# Patient Record
Sex: FEMALE | Race: WHITE | Employment: FULL TIME | URBAN - METROPOLITAN AREA
[De-identification: names, ages, dates, MRNs, and addresses within clinical notes are randomized per-mention and may not be internally consistent; named-entity substitution may affect disease eponyms.]

---

## 2017-02-24 ENCOUNTER — OFFICE VISIT (OUTPATIENT)
Dept: FAMILY MEDICINE CLINIC | Age: 57
End: 2017-02-24

## 2017-02-24 VITALS
BODY MASS INDEX: 39.05 KG/M2 | OXYGEN SATURATION: 98 % | WEIGHT: 234.4 LBS | SYSTOLIC BLOOD PRESSURE: 124 MMHG | DIASTOLIC BLOOD PRESSURE: 84 MMHG | HEART RATE: 68 BPM | HEIGHT: 65 IN | RESPIRATION RATE: 15 BRPM

## 2017-02-24 DIAGNOSIS — H90.3 SENSORINEURAL HEARING LOSS (SNHL), BILATERAL: ICD-10-CM

## 2017-02-24 DIAGNOSIS — E78.5 HYPERLIPIDEMIA, UNSPECIFIED: ICD-10-CM

## 2017-02-24 DIAGNOSIS — F33.42 RECURRENT MAJOR DEPRESSIVE DISORDER, IN FULL REMISSION (HCC): ICD-10-CM

## 2017-02-24 DIAGNOSIS — E66.9 OBESITY (BMI 30-39.9): ICD-10-CM

## 2017-02-24 DIAGNOSIS — Z98.84 S/P LAPAROSCOPIC SLEEVE GASTRECTOMY: ICD-10-CM

## 2017-02-24 DIAGNOSIS — K21.9 GASTROESOPHAGEAL REFLUX DISEASE, ESOPHAGITIS PRESENCE NOT SPECIFIED: ICD-10-CM

## 2017-02-24 DIAGNOSIS — E66.09 NON MORBID OBESITY DUE TO EXCESS CALORIES: ICD-10-CM

## 2017-02-24 DIAGNOSIS — E11.8 TYPE 2 DIABETES MELLITUS WITH COMPLICATION, WITHOUT LONG-TERM CURRENT USE OF INSULIN (HCC): Primary | ICD-10-CM

## 2017-02-24 DIAGNOSIS — J30.89 PERENNIAL ALLERGIC RHINITIS, UNSPECIFIED ALLERGIC RHINITIS TRIGGER: ICD-10-CM

## 2017-02-24 DIAGNOSIS — K76.0 HEPATIC STEATOSIS: ICD-10-CM

## 2017-02-24 DIAGNOSIS — I10 ESSENTIAL HYPERTENSION: ICD-10-CM

## 2017-02-24 PROCEDURE — 90471 IMMUNIZATION ADMIN: CPT | Performed by: FAMILY MEDICINE

## 2017-02-24 PROCEDURE — 90732 PPSV23 VACC 2 YRS+ SUBQ/IM: CPT | Performed by: FAMILY MEDICINE

## 2017-02-24 PROCEDURE — 99215 OFFICE O/P EST HI 40 MIN: CPT | Performed by: FAMILY MEDICINE

## 2017-03-07 ENCOUNTER — OFFICE VISIT (OUTPATIENT)
Dept: FAMILY MEDICINE CLINIC | Age: 57
End: 2017-03-07

## 2017-03-07 VITALS
WEIGHT: 235 LBS | BODY MASS INDEX: 37.77 KG/M2 | DIASTOLIC BLOOD PRESSURE: 82 MMHG | SYSTOLIC BLOOD PRESSURE: 130 MMHG | RESPIRATION RATE: 12 BRPM | HEIGHT: 66 IN | HEART RATE: 75 BPM | OXYGEN SATURATION: 96 %

## 2017-03-07 DIAGNOSIS — R35.0 URINARY FREQUENCY: Primary | ICD-10-CM

## 2017-03-07 LAB
BILIRUBIN, POC: 0
BLOOD URINE, POC: 0
CLARITY, POC: CLEAR
COLOR, POC: YELLOW
GLUCOSE URINE, POC: 0
KETONES, POC: 0
LEUKOCYTE EST, POC: ABNORMAL
NITRITE, POC: 0
PH, POC: 6
PROTEIN, POC: 0
SPECIFIC GRAVITY, POC: 1.02
UROBILINOGEN, POC: 0.2

## 2017-03-07 PROCEDURE — 99213 OFFICE O/P EST LOW 20 MIN: CPT | Performed by: NURSE PRACTITIONER

## 2017-03-07 PROCEDURE — 81002 URINALYSIS NONAUTO W/O SCOPE: CPT | Performed by: NURSE PRACTITIONER

## 2017-03-07 RX ORDER — CIPROFLOXACIN 500 MG/1
500 TABLET, FILM COATED ORAL 2 TIMES DAILY
Qty: 6 TABLET | Refills: 0 | Status: SHIPPED | OUTPATIENT
Start: 2017-03-07 | End: 2017-03-10

## 2017-03-07 ASSESSMENT — ENCOUNTER SYMPTOMS: ABDOMINAL PAIN: 0

## 2017-03-07 ASSESSMENT — PATIENT HEALTH QUESTIONNAIRE - PHQ9
1. LITTLE INTEREST OR PLEASURE IN DOING THINGS: 0
SUM OF ALL RESPONSES TO PHQ9 QUESTIONS 1 & 2: 0
SUM OF ALL RESPONSES TO PHQ QUESTIONS 1-9: 0
2. FEELING DOWN, DEPRESSED OR HOPELESS: 0

## 2017-03-09 LAB
ORGANISM: ABNORMAL
URINE CULTURE, ROUTINE: ABNORMAL

## 2017-03-28 DIAGNOSIS — E11.8 TYPE 2 DIABETES MELLITUS WITH COMPLICATION, WITHOUT LONG-TERM CURRENT USE OF INSULIN (HCC): ICD-10-CM

## 2017-03-28 DIAGNOSIS — I10 ESSENTIAL HYPERTENSION: ICD-10-CM

## 2017-03-28 DIAGNOSIS — E78.5 HYPERLIPIDEMIA, UNSPECIFIED: ICD-10-CM

## 2017-03-28 LAB
A/G RATIO: 1.5 (ref 1.1–2.2)
ALBUMIN SERPL-MCNC: 4.3 G/DL (ref 3.4–5)
ALP BLD-CCNC: 96 U/L (ref 40–129)
ALT SERPL-CCNC: 8 U/L (ref 10–40)
ANION GAP SERPL CALCULATED.3IONS-SCNC: 14 MMOL/L (ref 3–16)
AST SERPL-CCNC: 9 U/L (ref 15–37)
BASOPHILS ABSOLUTE: 0.1 K/UL (ref 0–0.2)
BASOPHILS RELATIVE PERCENT: 0.8 %
BILIRUB SERPL-MCNC: 0.3 MG/DL (ref 0–1)
BUN BLDV-MCNC: 18 MG/DL (ref 7–20)
CALCIUM SERPL-MCNC: 9.6 MG/DL (ref 8.3–10.6)
CHLORIDE BLD-SCNC: 103 MMOL/L (ref 99–110)
CHOLESTEROL, TOTAL: 273 MG/DL (ref 0–199)
CO2: 25 MMOL/L (ref 21–32)
CREAT SERPL-MCNC: 0.7 MG/DL (ref 0.6–1.1)
EOSINOPHILS ABSOLUTE: 0.2 K/UL (ref 0–0.6)
EOSINOPHILS RELATIVE PERCENT: 1.9 %
GFR AFRICAN AMERICAN: >60
GFR NON-AFRICAN AMERICAN: >60
GLOBULIN: 2.9 G/DL
GLUCOSE BLD-MCNC: 108 MG/DL (ref 70–99)
HCT VFR BLD CALC: 41.3 % (ref 36–48)
HDLC SERPL-MCNC: 43 MG/DL (ref 40–60)
HEMOGLOBIN: 13.1 G/DL (ref 12–16)
LDL CHOLESTEROL CALCULATED: ABNORMAL MG/DL
LYMPHOCYTES ABSOLUTE: 3.3 K/UL (ref 1–5.1)
LYMPHOCYTES RELATIVE PERCENT: 37.3 %
MCH RBC QN AUTO: 27 PG (ref 26–34)
MCHC RBC AUTO-ENTMCNC: 31.8 G/DL (ref 31–36)
MCV RBC AUTO: 85 FL (ref 80–100)
MONOCYTES ABSOLUTE: 0.6 K/UL (ref 0–1.3)
MONOCYTES RELATIVE PERCENT: 6.5 %
NEUTROPHILS ABSOLUTE: 4.7 K/UL (ref 1.7–7.7)
NEUTROPHILS RELATIVE PERCENT: 53.5 %
PDW BLD-RTO: 16 % (ref 12.4–15.4)
PLATELET # BLD: 287 K/UL (ref 135–450)
PMV BLD AUTO: 9.2 FL (ref 5–10.5)
POTASSIUM SERPL-SCNC: 4.5 MMOL/L (ref 3.5–5.1)
RBC # BLD: 4.86 M/UL (ref 4–5.2)
SODIUM BLD-SCNC: 142 MMOL/L (ref 136–145)
TOTAL PROTEIN: 7.2 G/DL (ref 6.4–8.2)
TRIGL SERPL-MCNC: 364 MG/DL (ref 0–150)
VLDLC SERPL CALC-MCNC: ABNORMAL MG/DL
WBC # BLD: 8.9 K/UL (ref 4–11)

## 2017-03-29 LAB
ESTIMATED AVERAGE GLUCOSE: 134.1 MG/DL
HBA1C MFR BLD: 6.3 %
LDL CHOLESTEROL DIRECT: 190 MG/DL

## 2017-03-31 ENCOUNTER — OFFICE VISIT (OUTPATIENT)
Dept: FAMILY MEDICINE CLINIC | Age: 57
End: 2017-03-31

## 2017-03-31 VITALS
WEIGHT: 233.6 LBS | RESPIRATION RATE: 16 BRPM | SYSTOLIC BLOOD PRESSURE: 128 MMHG | BODY MASS INDEX: 37.54 KG/M2 | OXYGEN SATURATION: 98 % | HEIGHT: 66 IN | HEART RATE: 72 BPM | DIASTOLIC BLOOD PRESSURE: 84 MMHG

## 2017-03-31 DIAGNOSIS — E66.9 OBESITY (BMI 30-39.9): ICD-10-CM

## 2017-03-31 DIAGNOSIS — E78.2 MIXED HYPERLIPIDEMIA: ICD-10-CM

## 2017-03-31 DIAGNOSIS — R94.31 Q WAVES SUGGESTIVE OF PREVIOUS MYOCARDIAL INFARCTION: ICD-10-CM

## 2017-03-31 DIAGNOSIS — R01.1 HEART MURMUR PREVIOUSLY UNDIAGNOSED: ICD-10-CM

## 2017-03-31 DIAGNOSIS — Z00.00 ANNUAL PHYSICAL EXAM: Primary | ICD-10-CM

## 2017-03-31 DIAGNOSIS — Z98.84 S/P LAPAROSCOPIC SLEEVE GASTRECTOMY: ICD-10-CM

## 2017-03-31 DIAGNOSIS — I10 ESSENTIAL HYPERTENSION: ICD-10-CM

## 2017-03-31 DIAGNOSIS — F33.42 RECURRENT MAJOR DEPRESSIVE DISORDER, IN FULL REMISSION (HCC): ICD-10-CM

## 2017-03-31 DIAGNOSIS — E11.9 TYPE 2 DIABETES MELLITUS WITHOUT COMPLICATION, WITHOUT LONG-TERM CURRENT USE OF INSULIN (HCC): ICD-10-CM

## 2017-03-31 DIAGNOSIS — Z12.31 VISIT FOR SCREENING MAMMOGRAM: ICD-10-CM

## 2017-03-31 LAB
BILIRUBIN, POC: NORMAL
BLOOD URINE, POC: NORMAL
CLARITY, POC: CLEAR
COLOR, POC: YELLOW
CREATININE URINE POCT: 200
GLUCOSE URINE, POC: NORMAL
KETONES, POC: NORMAL
LEUKOCYTE EST, POC: NORMAL
MICROALBUMIN/CREAT 24H UR: 10 MG/G{CREAT}
MICROALBUMIN/CREAT UR-RTO: <30
NITRITE, POC: NORMAL
PH, POC: 6
PROTEIN, POC: NORMAL
SPECIFIC GRAVITY, POC: >1.03
UROBILINOGEN, POC: 0.2

## 2017-03-31 PROCEDURE — 82044 UR ALBUMIN SEMIQUANTITATIVE: CPT | Performed by: FAMILY MEDICINE

## 2017-03-31 PROCEDURE — 99396 PREV VISIT EST AGE 40-64: CPT | Performed by: FAMILY MEDICINE

## 2017-03-31 PROCEDURE — 93000 ELECTROCARDIOGRAM COMPLETE: CPT | Performed by: FAMILY MEDICINE

## 2017-03-31 PROCEDURE — 81002 URINALYSIS NONAUTO W/O SCOPE: CPT | Performed by: FAMILY MEDICINE

## 2017-03-31 RX ORDER — ATORVASTATIN CALCIUM 40 MG/1
40 TABLET, FILM COATED ORAL NIGHTLY
Qty: 30 TABLET | Refills: 11 | Status: SHIPPED | OUTPATIENT
Start: 2017-03-31 | End: 2017-05-31 | Stop reason: SDUPTHER

## 2017-04-03 ENCOUNTER — OFFICE VISIT (OUTPATIENT)
Dept: CARDIOLOGY CLINIC | Age: 57
End: 2017-04-03

## 2017-04-03 VITALS
HEIGHT: 65 IN | DIASTOLIC BLOOD PRESSURE: 70 MMHG | SYSTOLIC BLOOD PRESSURE: 116 MMHG | BODY MASS INDEX: 38.49 KG/M2 | HEART RATE: 84 BPM | WEIGHT: 231 LBS

## 2017-04-03 DIAGNOSIS — R07.9 CHEST PAIN, UNSPECIFIED TYPE: ICD-10-CM

## 2017-04-03 DIAGNOSIS — R06.02 SHORTNESS OF BREATH: ICD-10-CM

## 2017-04-03 DIAGNOSIS — R94.31 ABNORMAL EKG: Primary | ICD-10-CM

## 2017-04-03 DIAGNOSIS — E78.5 HYPERLIPIDEMIA, UNSPECIFIED HYPERLIPIDEMIA TYPE: ICD-10-CM

## 2017-04-03 PROCEDURE — 99244 OFF/OP CNSLTJ NEW/EST MOD 40: CPT | Performed by: INTERNAL MEDICINE

## 2017-04-13 ENCOUNTER — OFFICE VISIT (OUTPATIENT)
Dept: CARDIOLOGY CLINIC | Age: 57
End: 2017-04-13

## 2017-04-13 ENCOUNTER — HOSPITAL ENCOUNTER (OUTPATIENT)
Dept: NON INVASIVE DIAGNOSTICS | Age: 57
Discharge: HOME | End: 2017-04-13
Attending: INTERNAL MEDICINE | Admitting: INTERNAL MEDICINE

## 2017-04-13 VITALS
HEART RATE: 80 BPM | SYSTOLIC BLOOD PRESSURE: 136 MMHG | WEIGHT: 231 LBS | BODY MASS INDEX: 38.49 KG/M2 | DIASTOLIC BLOOD PRESSURE: 62 MMHG | HEIGHT: 65 IN

## 2017-04-13 DIAGNOSIS — R07.2 PRECORDIAL PAIN: ICD-10-CM

## 2017-04-13 DIAGNOSIS — R94.31 ABNORMAL EKG: Primary | ICD-10-CM

## 2017-04-13 PROBLEM — R07.9 CHEST PAIN: Status: ACTIVE | Noted: 2017-04-13

## 2017-04-13 PROCEDURE — 99213 OFFICE O/P EST LOW 20 MIN: CPT | Performed by: INTERNAL MEDICINE

## 2017-04-26 ENCOUNTER — TELEPHONE (OUTPATIENT)
Dept: FAMILY MEDICINE CLINIC | Age: 57
End: 2017-04-26

## 2017-05-31 DIAGNOSIS — F33.42 RECURRENT MAJOR DEPRESSIVE DISORDER, IN FULL REMISSION (HCC): ICD-10-CM

## 2017-05-31 DIAGNOSIS — E78.2 MIXED HYPERLIPIDEMIA: ICD-10-CM

## 2017-05-31 RX ORDER — VALSARTAN AND HYDROCHLOROTHIAZIDE 320; 25 MG/1; MG/1
1 TABLET, FILM COATED ORAL DAILY
Qty: 30 TABLET | Refills: 0 | Status: SHIPPED | OUTPATIENT
Start: 2017-05-31 | End: 2017-10-18 | Stop reason: SDUPTHER

## 2017-05-31 RX ORDER — ATORVASTATIN CALCIUM 40 MG/1
40 TABLET, FILM COATED ORAL NIGHTLY
Qty: 30 TABLET | Refills: 11 | Status: SHIPPED | OUTPATIENT
Start: 2017-05-31 | End: 2017-10-18 | Stop reason: SDUPTHER

## 2017-05-31 RX ORDER — VALSARTAN AND HYDROCHLOROTHIAZIDE 320; 25 MG/1; MG/1
1 TABLET, FILM COATED ORAL DAILY
Qty: 90 TABLET | Refills: 2 | Status: SHIPPED | OUTPATIENT
Start: 2017-05-31 | End: 2021-09-20

## 2017-05-31 RX ORDER — ATORVASTATIN CALCIUM 40 MG/1
40 TABLET, FILM COATED ORAL NIGHTLY
Qty: 90 TABLET | Refills: 2 | Status: SHIPPED | OUTPATIENT
Start: 2017-05-31 | End: 2019-08-02

## 2017-10-18 ENCOUNTER — OFFICE VISIT (OUTPATIENT)
Dept: ENT CLINIC | Age: 57
End: 2017-10-18

## 2017-10-18 VITALS
HEIGHT: 65 IN | HEART RATE: 79 BPM | WEIGHT: 242 LBS | SYSTOLIC BLOOD PRESSURE: 170 MMHG | DIASTOLIC BLOOD PRESSURE: 92 MMHG | BODY MASS INDEX: 40.32 KG/M2

## 2017-10-18 DIAGNOSIS — J30.9 ALLERGIC SINUSITIS: Primary | ICD-10-CM

## 2017-10-18 PROCEDURE — 99213 OFFICE O/P EST LOW 20 MIN: CPT | Performed by: OTOLARYNGOLOGY

## 2017-10-18 PROCEDURE — 96372 THER/PROPH/DIAG INJ SC/IM: CPT | Performed by: OTOLARYNGOLOGY

## 2017-10-18 RX ORDER — METHYLPREDNISOLONE ACETATE 40 MG/ML
40 INJECTION, SUSPENSION INTRA-ARTICULAR; INTRALESIONAL; INTRAMUSCULAR; SOFT TISSUE ONCE
Status: COMPLETED | OUTPATIENT
Start: 2017-10-18 | End: 2017-10-18

## 2017-10-18 RX ORDER — AZELASTINE HYDROCHLORIDE, FLUTICASONE PROPIONATE 137; 50 UG/1; UG/1
1 SPRAY, METERED NASAL DAILY
Qty: 1 BOTTLE | Refills: 0 | COMMUNITY
Start: 2017-10-18 | End: 2018-01-05

## 2017-10-18 RX ORDER — MONTELUKAST SODIUM 10 MG/1
10 TABLET ORAL NIGHTLY
Qty: 30 TABLET | Refills: 1 | Status: SHIPPED | OUTPATIENT
Start: 2017-10-18 | End: 2018-01-01 | Stop reason: SDUPTHER

## 2017-10-18 RX ORDER — SERTRALINE HYDROCHLORIDE 100 MG/1
125 TABLET, FILM COATED ORAL
COMMUNITY
Start: 2017-10-15

## 2017-10-18 RX ADMIN — METHYLPREDNISOLONE ACETATE 40 MG: 40 INJECTION, SUSPENSION INTRA-ARTICULAR; INTRALESIONAL; INTRAMUSCULAR; SOFT TISSUE at 10:14

## 2017-10-18 NOTE — ADDENDUM NOTE
Encounter addended by:  Reji Goldsmith on: 10/18/2017 10:12 AM<BR>    Actions taken: Vitals modified

## 2017-10-18 NOTE — PROGRESS NOTES
Patient has been noticing an increase in allergy symptoms with congestion in her nose and postnasal drainage over the course of past 2 months. She has not been on allergy treatment for the last year primarily due to emotional problems that are being resolved and have been resolved with medication. She has had no fever. She has been trying over-the-counter medication without success. She does not smoke. Currently she is in no acute distress. Ear examination reveals normal tympanic membranes and ear canals on both sides. The oral examination is unremarkable. The neck is free of adenopathy, mass, thyroid enlargement. Nasal mucosa treated with cotton pledgets  impregnated with Afrin and lidocaine placed in middle meatuses against turbinates. Pledgets left in place for five minutes and removed enabling enhanced visualization. The exam revealed positive edema of mucosa. it was negative for erythema indicative of infection. There was not evidence of deviation of the septum which was not significant. There were not polyps present. .There were not other masses present. The turbinates were not enlarged beyond normal.  Findings are indicative of an allergic sinusitis. We'll try a course of Singulair along with Dymista spray and an injection of Depo-Medrol. I've asked that she contact me in 2 weeks to be sure she is responding.

## 2018-01-01 DIAGNOSIS — J30.9 ALLERGIC SINUSITIS: ICD-10-CM

## 2018-01-02 RX ORDER — MONTELUKAST SODIUM 10 MG/1
10 TABLET ORAL NIGHTLY
Qty: 30 TABLET | Refills: 1 | Status: SHIPPED | OUTPATIENT
Start: 2018-01-02 | End: 2018-04-17

## 2018-01-05 ENCOUNTER — TELEPHONE (OUTPATIENT)
Dept: ENT CLINIC | Age: 58
End: 2018-01-05

## 2018-01-05 ENCOUNTER — OFFICE VISIT (OUTPATIENT)
Dept: ENT CLINIC | Age: 58
End: 2018-01-05

## 2018-01-05 VITALS — BODY MASS INDEX: 39.11 KG/M2 | WEIGHT: 235 LBS | SYSTOLIC BLOOD PRESSURE: 110 MMHG | DIASTOLIC BLOOD PRESSURE: 80 MMHG

## 2018-01-05 DIAGNOSIS — J30.9 ALLERGIC SINUSITIS: Primary | ICD-10-CM

## 2018-01-05 PROCEDURE — 99213 OFFICE O/P EST LOW 20 MIN: CPT | Performed by: OTOLARYNGOLOGY

## 2018-01-05 PROCEDURE — 96372 THER/PROPH/DIAG INJ SC/IM: CPT | Performed by: OTOLARYNGOLOGY

## 2018-01-05 RX ORDER — FLUTICASONE PROPIONATE 50 MCG
2 SPRAY, SUSPENSION (ML) NASAL DAILY
Qty: 1 BOTTLE | Refills: 3 | Status: SHIPPED | OUTPATIENT
Start: 2018-01-05 | End: 2018-06-26

## 2018-01-05 RX ORDER — MONTELUKAST SODIUM 10 MG/1
10 TABLET ORAL NIGHTLY
Qty: 30 TABLET | Refills: 3 | Status: SHIPPED | OUTPATIENT
Start: 2018-01-05 | End: 2018-06-26

## 2018-01-05 RX ORDER — METHYLPREDNISOLONE ACETATE 40 MG/ML
40 INJECTION, SUSPENSION INTRA-ARTICULAR; INTRALESIONAL; INTRAMUSCULAR; SOFT TISSUE ONCE
Status: COMPLETED | OUTPATIENT
Start: 2018-01-05 | End: 2018-01-05

## 2018-01-05 RX ORDER — AZELASTINE HYDROCHLORIDE, FLUTICASONE PROPIONATE 137; 50 UG/1; UG/1
1 SPRAY, METERED NASAL DAILY
Qty: 1 BOTTLE | Refills: 3 | Status: SHIPPED | OUTPATIENT
Start: 2018-01-05 | End: 2018-04-17

## 2018-01-05 RX ADMIN — METHYLPREDNISOLONE ACETATE 40 MG: 40 INJECTION, SUSPENSION INTRA-ARTICULAR; INTRALESIONAL; INTRAMUSCULAR; SOFT TISSUE at 09:16

## 2018-01-05 NOTE — TELEPHONE ENCOUNTER
Patient called and said prescription called to [facility] for nasal spray requires prior auth. Patient is going out of town tomorrow. She asked if she may need to get something different so she is able to get it before she leaves. Her call back number is [phone]. Thank you.

## 2018-01-18 LAB
AVERAGE GLUCOSE: NORMAL
AVERAGE GLUCOSE: NORMAL
HBA1C MFR BLD: 5.8 %
HBA1C MFR BLD: 5.8 %

## 2018-01-22 ENCOUNTER — TELEPHONE (OUTPATIENT)
Dept: ENT CLINIC | Age: 58
End: 2018-01-22

## 2018-01-22 DIAGNOSIS — J32.9 RECURRENT SINUSITIS: Primary | ICD-10-CM

## 2018-01-22 RX ORDER — CEFDINIR 300 MG/1
300 CAPSULE ORAL 2 TIMES DAILY
Qty: 20 CAPSULE | Refills: 0 | Status: SHIPPED | OUTPATIENT
Start: 2018-01-22 | End: 2018-04-17

## 2018-04-17 ENCOUNTER — OFFICE VISIT (OUTPATIENT)
Dept: ENT CLINIC | Age: 58
End: 2018-04-17

## 2018-04-17 VITALS — SYSTOLIC BLOOD PRESSURE: 132 MMHG | DIASTOLIC BLOOD PRESSURE: 80 MMHG | HEART RATE: 80 BPM

## 2018-04-17 DIAGNOSIS — J30.1 CHRONIC SEASONAL ALLERGIC RHINITIS DUE TO POLLEN: Primary | ICD-10-CM

## 2018-04-17 PROCEDURE — 96372 THER/PROPH/DIAG INJ SC/IM: CPT | Performed by: OTOLARYNGOLOGY

## 2018-04-17 PROCEDURE — 99213 OFFICE O/P EST LOW 20 MIN: CPT | Performed by: OTOLARYNGOLOGY

## 2018-04-17 RX ORDER — METHYLPREDNISOLONE ACETATE 40 MG/ML
40 INJECTION, SUSPENSION INTRA-ARTICULAR; INTRALESIONAL; INTRAMUSCULAR; SOFT TISSUE ONCE
Status: COMPLETED | OUTPATIENT
Start: 2018-04-17 | End: 2018-04-17

## 2018-04-17 RX ADMIN — METHYLPREDNISOLONE ACETATE 40 MG: 40 INJECTION, SUSPENSION INTRA-ARTICULAR; INTRALESIONAL; INTRAMUSCULAR; SOFT TISSUE at 16:26

## 2018-06-26 ENCOUNTER — OFFICE VISIT (OUTPATIENT)
Dept: ENT CLINIC | Age: 58
End: 2018-06-26

## 2018-06-26 VITALS — SYSTOLIC BLOOD PRESSURE: 120 MMHG | DIASTOLIC BLOOD PRESSURE: 88 MMHG

## 2018-06-26 DIAGNOSIS — J30.9 ALLERGIC SINUSITIS: ICD-10-CM

## 2018-06-26 DIAGNOSIS — J32.9 RECURRENT SINUSITIS: Primary | ICD-10-CM

## 2018-06-26 PROCEDURE — 99213 OFFICE O/P EST LOW 20 MIN: CPT | Performed by: OTOLARYNGOLOGY

## 2018-06-26 RX ORDER — LEVOFLOXACIN 500 MG/1
500 TABLET, FILM COATED ORAL DAILY
Qty: 10 TABLET | Refills: 0 | Status: SHIPPED | OUTPATIENT
Start: 2018-06-26 | End: 2019-08-02

## 2018-06-26 RX ORDER — PREDNISONE 10 MG/1
TABLET ORAL
Qty: 25 TABLET | Refills: 0 | Status: SHIPPED | OUTPATIENT
Start: 2018-06-26 | End: 2018-10-30 | Stop reason: ALTCHOICE

## 2018-08-05 DIAGNOSIS — J30.9 ALLERGIC SINUSITIS: ICD-10-CM

## 2018-08-06 RX ORDER — FLUTICASONE PROPIONATE 50 MCG
SPRAY, SUSPENSION (ML) NASAL
Qty: 1 BOTTLE | Refills: 3 | Status: SHIPPED | OUTPATIENT
Start: 2018-08-06 | End: 2021-09-20

## 2018-09-21 ENCOUNTER — TELEPHONE (OUTPATIENT)
Dept: BARIATRICS/WEIGHT MGMT | Age: 58
End: 2018-09-21

## 2018-10-30 ENCOUNTER — OFFICE VISIT (OUTPATIENT)
Dept: ENT CLINIC | Age: 58
End: 2018-10-30
Payer: COMMERCIAL

## 2018-10-30 VITALS — SYSTOLIC BLOOD PRESSURE: 150 MMHG | HEART RATE: 87 BPM | DIASTOLIC BLOOD PRESSURE: 72 MMHG | OXYGEN SATURATION: 95 %

## 2018-10-30 DIAGNOSIS — J30.9 ALLERGIC SINUSITIS: Primary | ICD-10-CM

## 2018-10-30 PROCEDURE — 99213 OFFICE O/P EST LOW 20 MIN: CPT | Performed by: OTOLARYNGOLOGY

## 2018-10-30 PROCEDURE — 96372 THER/PROPH/DIAG INJ SC/IM: CPT | Performed by: OTOLARYNGOLOGY

## 2018-10-30 RX ORDER — METHYLPREDNISOLONE ACETATE 40 MG/ML
40 INJECTION, SUSPENSION INTRA-ARTICULAR; INTRALESIONAL; INTRAMUSCULAR; SOFT TISSUE ONCE
Status: COMPLETED | OUTPATIENT
Start: 2018-10-30 | End: 2018-10-30

## 2018-10-30 RX ADMIN — METHYLPREDNISOLONE ACETATE 40 MG: 40 INJECTION, SUSPENSION INTRA-ARTICULAR; INTRALESIONAL; INTRAMUSCULAR; SOFT TISSUE at 16:19

## 2019-05-30 ENCOUNTER — OFFICE VISIT (OUTPATIENT)
Dept: ENT CLINIC | Age: 59
End: 2019-05-30
Payer: COMMERCIAL

## 2019-05-30 VITALS — SYSTOLIC BLOOD PRESSURE: 130 MMHG | HEART RATE: 81 BPM | OXYGEN SATURATION: 95 % | DIASTOLIC BLOOD PRESSURE: 72 MMHG

## 2019-05-30 DIAGNOSIS — J30.9 ALLERGIC SINUSITIS: ICD-10-CM

## 2019-05-30 DIAGNOSIS — J32.9 RECURRENT SINUSITIS: Primary | ICD-10-CM

## 2019-05-30 PROCEDURE — 3017F COLORECTAL CA SCREEN DOC REV: CPT | Performed by: OTOLARYNGOLOGY

## 2019-05-30 PROCEDURE — 96372 THER/PROPH/DIAG INJ SC/IM: CPT | Performed by: OTOLARYNGOLOGY

## 2019-05-30 PROCEDURE — G8421 BMI NOT CALCULATED: HCPCS | Performed by: OTOLARYNGOLOGY

## 2019-05-30 PROCEDURE — 1036F TOBACCO NON-USER: CPT | Performed by: OTOLARYNGOLOGY

## 2019-05-30 PROCEDURE — G8427 DOCREV CUR MEDS BY ELIG CLIN: HCPCS | Performed by: OTOLARYNGOLOGY

## 2019-05-30 PROCEDURE — 99213 OFFICE O/P EST LOW 20 MIN: CPT | Performed by: OTOLARYNGOLOGY

## 2019-05-30 RX ORDER — METHYLPREDNISOLONE ACETATE 40 MG/ML
40 INJECTION, SUSPENSION INTRA-ARTICULAR; INTRALESIONAL; INTRAMUSCULAR; SOFT TISSUE ONCE
Status: COMPLETED | OUTPATIENT
Start: 2019-05-30 | End: 2019-05-30

## 2019-05-30 RX ORDER — AZITHROMYCIN 250 MG/1
TABLET, FILM COATED ORAL
Qty: 1 PACKET | Refills: 0 | Status: SHIPPED | OUTPATIENT
Start: 2019-05-30 | End: 2019-08-02

## 2019-05-30 RX ORDER — MONTELUKAST SODIUM 10 MG/1
10 TABLET ORAL NIGHTLY
Qty: 30 TABLET | Refills: 1 | Status: SHIPPED | OUTPATIENT
Start: 2019-05-30 | End: 2019-08-02

## 2019-05-30 RX ADMIN — METHYLPREDNISOLONE ACETATE 40 MG: 40 INJECTION, SUSPENSION INTRA-ARTICULAR; INTRALESIONAL; INTRAMUSCULAR; SOFT TISSUE at 12:23

## 2019-05-31 NOTE — PROGRESS NOTES
Patient relates having a sinus congestion with the facial pain over the course of past month. She did take Sudafed once with little improvement. She's been using Flonase nasal spray. There's been or other constitutional symptoms. She does not smoke. Currently, she appears no obvious acute distress. Ear examination reveals normal findings with normal-appearing to back membranes and ear canals. Oral examination is unremarkable. The nasal mucosa is normal as well. Nasal mucosa treated with cotton pledgets  impregnated with Afrin and lidocaine placed in middle meatuses against turbinates. Pledgets left in place for five minutes and removed enabling enhanced visualization. The exam revealed positive edema of mucosa. it was positive for erythema indicative of infection. There was not evidence of deviation of the septum which was not significant. There were not polyps present. .There were not other masses present. The turbinates were not enlarged beyond normal.  Findings are consistent with a sinus infection associated allergy. We'll her on Z-Stanford along with the Singulair and she will continue her Flonase nasal spray. A Depo-Medrol injection will also be given.

## 2019-08-02 ENCOUNTER — OFFICE VISIT (OUTPATIENT)
Dept: ENT CLINIC | Age: 59
End: 2019-08-02
Payer: COMMERCIAL

## 2019-08-02 VITALS — OXYGEN SATURATION: 96 % | HEART RATE: 98 BPM | DIASTOLIC BLOOD PRESSURE: 68 MMHG | SYSTOLIC BLOOD PRESSURE: 126 MMHG

## 2019-08-02 DIAGNOSIS — J30.9 ALLERGIC SINUSITIS: Primary | ICD-10-CM

## 2019-08-02 PROCEDURE — G8421 BMI NOT CALCULATED: HCPCS | Performed by: OTOLARYNGOLOGY

## 2019-08-02 PROCEDURE — 96372 THER/PROPH/DIAG INJ SC/IM: CPT | Performed by: OTOLARYNGOLOGY

## 2019-08-02 PROCEDURE — 1036F TOBACCO NON-USER: CPT | Performed by: OTOLARYNGOLOGY

## 2019-08-02 PROCEDURE — 99213 OFFICE O/P EST LOW 20 MIN: CPT | Performed by: OTOLARYNGOLOGY

## 2019-08-02 PROCEDURE — 3017F COLORECTAL CA SCREEN DOC REV: CPT | Performed by: OTOLARYNGOLOGY

## 2019-08-02 PROCEDURE — G8427 DOCREV CUR MEDS BY ELIG CLIN: HCPCS | Performed by: OTOLARYNGOLOGY

## 2019-08-02 RX ORDER — METHYLPREDNISOLONE ACETATE 40 MG/ML
40 INJECTION, SUSPENSION INTRA-ARTICULAR; INTRALESIONAL; INTRAMUSCULAR; SOFT TISSUE ONCE
Status: COMPLETED | OUTPATIENT
Start: 2019-08-02 | End: 2019-08-02

## 2019-08-02 RX ORDER — FLUTICASONE PROPIONATE 50 MCG
2 SPRAY, SUSPENSION (ML) NASAL DAILY
Qty: 1 BOTTLE | Refills: 3 | Status: SHIPPED | OUTPATIENT
Start: 2019-08-02 | End: 2020-03-10

## 2019-08-02 RX ORDER — MONTELUKAST SODIUM 10 MG/1
10 TABLET ORAL NIGHTLY
Qty: 30 TABLET | Refills: 1 | Status: SHIPPED | OUTPATIENT
Start: 2019-08-02 | End: 2020-03-27 | Stop reason: SDUPTHER

## 2019-08-02 RX ADMIN — METHYLPREDNISOLONE ACETATE 40 MG: 40 INJECTION, SUSPENSION INTRA-ARTICULAR; INTRALESIONAL; INTRAMUSCULAR; SOFT TISSUE at 16:37

## 2020-03-10 ENCOUNTER — OFFICE VISIT (OUTPATIENT)
Dept: ENT CLINIC | Age: 60
End: 2020-03-10
Payer: COMMERCIAL

## 2020-03-10 VITALS — HEART RATE: 76 BPM | DIASTOLIC BLOOD PRESSURE: 78 MMHG | OXYGEN SATURATION: 95 % | SYSTOLIC BLOOD PRESSURE: 140 MMHG

## 2020-03-10 PROCEDURE — G8484 FLU IMMUNIZE NO ADMIN: HCPCS | Performed by: OTOLARYNGOLOGY

## 2020-03-10 PROCEDURE — 1036F TOBACCO NON-USER: CPT | Performed by: OTOLARYNGOLOGY

## 2020-03-10 PROCEDURE — G8421 BMI NOT CALCULATED: HCPCS | Performed by: OTOLARYNGOLOGY

## 2020-03-10 PROCEDURE — G8427 DOCREV CUR MEDS BY ELIG CLIN: HCPCS | Performed by: OTOLARYNGOLOGY

## 2020-03-10 PROCEDURE — 3017F COLORECTAL CA SCREEN DOC REV: CPT | Performed by: OTOLARYNGOLOGY

## 2020-03-10 PROCEDURE — 99213 OFFICE O/P EST LOW 20 MIN: CPT | Performed by: OTOLARYNGOLOGY

## 2020-03-10 PROCEDURE — 96372 THER/PROPH/DIAG INJ SC/IM: CPT | Performed by: OTOLARYNGOLOGY

## 2020-03-10 RX ORDER — METHYLPREDNISOLONE ACETATE 40 MG/ML
40 INJECTION, SUSPENSION INTRA-ARTICULAR; INTRALESIONAL; INTRAMUSCULAR; SOFT TISSUE ONCE
Status: COMPLETED | OUTPATIENT
Start: 2020-03-10 | End: 2020-03-10

## 2020-03-10 RX ORDER — AZITHROMYCIN 250 MG/1
250 TABLET, FILM COATED ORAL DAILY
Qty: 1 PACKET | Refills: 0 | Status: SHIPPED | OUTPATIENT
Start: 2020-03-10 | End: 2020-03-17 | Stop reason: SDUPTHER

## 2020-03-10 RX ADMIN — METHYLPREDNISOLONE ACETATE 40 MG: 40 INJECTION, SUSPENSION INTRA-ARTICULAR; INTRALESIONAL; INTRAMUSCULAR; SOFT TISSUE at 16:01

## 2020-03-10 NOTE — PROGRESS NOTES
Over the course the past 3 to 4 weeks, the patient has been noticing a marked increase in sinus congestion postnasal drainage despite her use of Singulair and Flonase nasal spray. She has had no fever. He does not smoke. Currently, she appears in no obvious acute distress. Ear examination reveals normal-appearing tympanic membranes and ear canals. Oral examination is unremarkable. The neck is free of adenopathy, mass, thyroid enlargement. Nasal mucosa treated with cotton pledgets  impregnated with Afrin and lidocaine placed in middle meatuses against turbinates. Pledgets left in place for five minutes and removed enabling enhanced visualization. The exam revealed positive edema of mucosa. it was positive for erythema indicative of infection. There was not evidence of deviation of the septum which was not significant. There were not polyps present. .There were not other masses present. The turbinates were not enlarged beyond normal.  There remains evidence of a sinus infection with associated allergy as a compromising factor. Currently, we will start her on Z-Stanford as well as continuation of her current medication. We will also give her a Depo-Medrol injection. She is instructed to contact the office in 7 to 10 days if no particular improvement occurs.

## 2020-03-17 RX ORDER — AZITHROMYCIN 250 MG/1
250 TABLET, FILM COATED ORAL DAILY
Qty: 1 PACKET | Refills: 0 | Status: SHIPPED | OUTPATIENT
Start: 2020-03-17 | End: 2020-09-09 | Stop reason: ALTCHOICE

## 2020-03-18 ENCOUNTER — TELEPHONE (OUTPATIENT)
Dept: ENT CLINIC | Age: 60
End: 2020-03-18

## 2020-03-18 RX ORDER — METHYLPREDNISOLONE 4 MG/1
4 TABLET ORAL SEE ADMIN INSTRUCTIONS
Qty: 1 KIT | Refills: 0 | Status: SHIPPED | OUTPATIENT
Start: 2020-03-18 | End: 2020-09-09

## 2020-03-18 NOTE — TELEPHONE ENCOUNTER
Patient call and say she needs steroid med, patient did had steroid Depo shot on 3/10/2020. Patient say you been seeing her and you know what she needs. please advise. [employer], [address] [facility] [place].

## 2020-03-27 RX ORDER — MONTELUKAST SODIUM 10 MG/1
TABLET ORAL
Qty: 30 TABLET | Refills: 1 | Status: SHIPPED | OUTPATIENT
Start: 2020-03-27 | End: 2020-05-26 | Stop reason: SDUPTHER

## 2020-05-26 RX ORDER — MONTELUKAST SODIUM 10 MG/1
TABLET ORAL
Qty: 30 TABLET | Refills: 1 | Status: SHIPPED | OUTPATIENT
Start: 2020-05-26 | End: 2021-09-20

## 2020-09-09 ENCOUNTER — OFFICE VISIT (OUTPATIENT)
Dept: ENT CLINIC | Age: 60
End: 2020-09-09
Payer: COMMERCIAL

## 2020-09-09 ENCOUNTER — TELEPHONE (OUTPATIENT)
Dept: BARIATRICS/WEIGHT MGMT | Age: 60
End: 2020-09-09

## 2020-09-09 VITALS — DIASTOLIC BLOOD PRESSURE: 83 MMHG | HEART RATE: 98 BPM | TEMPERATURE: 96.9 F | SYSTOLIC BLOOD PRESSURE: 170 MMHG

## 2020-09-09 PROCEDURE — 3017F COLORECTAL CA SCREEN DOC REV: CPT | Performed by: OTOLARYNGOLOGY

## 2020-09-09 PROCEDURE — G8427 DOCREV CUR MEDS BY ELIG CLIN: HCPCS | Performed by: OTOLARYNGOLOGY

## 2020-09-09 PROCEDURE — 1036F TOBACCO NON-USER: CPT | Performed by: OTOLARYNGOLOGY

## 2020-09-09 PROCEDURE — G8421 BMI NOT CALCULATED: HCPCS | Performed by: OTOLARYNGOLOGY

## 2020-09-09 PROCEDURE — 99213 OFFICE O/P EST LOW 20 MIN: CPT | Performed by: OTOLARYNGOLOGY

## 2020-09-09 RX ORDER — METHYLPREDNISOLONE 4 MG/1
4 TABLET ORAL SEE ADMIN INSTRUCTIONS
Qty: 1 KIT | Refills: 0 | Status: SHIPPED | OUTPATIENT
Start: 2020-09-09 | End: 2021-09-20

## 2020-09-09 RX ORDER — AMOXICILLIN AND CLAVULANATE POTASSIUM 875; 125 MG/1; MG/1
1 TABLET, FILM COATED ORAL 2 TIMES DAILY
Qty: 20 TABLET | Refills: 0 | Status: SHIPPED | OUTPATIENT
Start: 2020-09-09 | End: 2021-09-20

## 2020-09-09 NOTE — PROGRESS NOTES
For the past few weeks, the patient has been noticing an increase in sinus congestion with purulent drainage being noted on both sides. This is but unassociated with actual temperature elevation. She did have an occasional vertiginous episode occasionally associated with nausea over the course of the past week which seemed to only last a few seconds at a time and was more associated with some of the sinus complaint. She has been using over-the-counter Claritin as well as Sudafed with little improvement. She has a history of seasonal problems primarily in this particular season as well. She is a non-smoker. Currently, she appears to be in no acute distress. Ear examination reveals what appears to be a normal-looking tympanic membrane and ear canal on each side with no evidence of inflammation or fluid. Oral examination is unremarkable. There is no nystagmus present. The neck is free of any adenopathy, mass, thyroid enlargement. Nasal mucosa treated with cotton pledgets  impregnated with Afrin and lidocaine placed in middle meatuses against turbinates. Pledgets left in place for five minutes and removed enabling enhanced visualization. The exam revealed positive edema of mucosa. it was positive for erythema indicative of infection. There was not evidence of deviation of the septum which was not significant. There were not polyps present. .There were not other masses present. The turbinates were not enlarged beyond normal.  Seems to be sinus infection associated with allergy and likely eustachian tube dysfunction producing the vertiginous episodes. As a result, we will try a course of Augmentin along with a Medrol Dosepak. She will contact the office in 1 week if no significant improvement occurs in all of her symptomatology.

## 2020-11-03 ENCOUNTER — OFFICE VISIT (OUTPATIENT)
Dept: ENT CLINIC | Age: 60
End: 2020-11-03
Payer: COMMERCIAL

## 2020-11-03 VITALS
SYSTOLIC BLOOD PRESSURE: 151 MMHG | TEMPERATURE: 98.7 F | WEIGHT: 235 LBS | BODY MASS INDEX: 39.11 KG/M2 | DIASTOLIC BLOOD PRESSURE: 94 MMHG | HEART RATE: 96 BPM

## 2020-11-03 PROCEDURE — 1036F TOBACCO NON-USER: CPT | Performed by: OTOLARYNGOLOGY

## 2020-11-03 PROCEDURE — G8484 FLU IMMUNIZE NO ADMIN: HCPCS | Performed by: OTOLARYNGOLOGY

## 2020-11-03 PROCEDURE — G8417 CALC BMI ABV UP PARAM F/U: HCPCS | Performed by: OTOLARYNGOLOGY

## 2020-11-03 PROCEDURE — 3017F COLORECTAL CA SCREEN DOC REV: CPT | Performed by: OTOLARYNGOLOGY

## 2020-11-03 PROCEDURE — G8427 DOCREV CUR MEDS BY ELIG CLIN: HCPCS | Performed by: OTOLARYNGOLOGY

## 2020-11-03 PROCEDURE — 99213 OFFICE O/P EST LOW 20 MIN: CPT | Performed by: OTOLARYNGOLOGY

## 2020-11-03 RX ORDER — METHYLPREDNISOLONE 4 MG/1
4 TABLET ORAL SEE ADMIN INSTRUCTIONS
Qty: 1 KIT | Refills: 0 | Status: SHIPPED | OUTPATIENT
Start: 2020-11-03 | End: 2021-09-20

## 2020-11-03 RX ORDER — TRAZODONE HYDROCHLORIDE 100 MG/1
25 TABLET ORAL NIGHTLY
COMMUNITY

## 2020-11-03 RX ORDER — LEVOFLOXACIN 500 MG/1
500 TABLET, FILM COATED ORAL DAILY
Qty: 10 TABLET | Refills: 0 | Status: SHIPPED | OUTPATIENT
Start: 2020-11-03 | End: 2021-09-20

## 2020-11-03 RX ORDER — PROMETHAZINE HYDROCHLORIDE AND CODEINE PHOSPHATE 6.25; 1 MG/5ML; MG/5ML
5 SYRUP ORAL EVERY 4 HOURS PRN
Qty: 125 ML | Refills: 0 | Status: SHIPPED | OUTPATIENT
Start: 2020-11-03 | End: 2020-11-06

## 2020-11-03 RX ORDER — PROMETHAZINE HYDROCHLORIDE AND CODEINE PHOSPHATE 6.25; 1 MG/5ML; MG/5ML
5 SYRUP ORAL EVERY 4 HOURS PRN
Qty: 125 ML | Refills: 0 | Status: SHIPPED | OUTPATIENT
Start: 2020-11-03 | End: 2020-11-03 | Stop reason: SDUPTHER

## 2020-11-03 NOTE — PROGRESS NOTES
Patient has been having sinus congestion and postnasal drainage over the course the past several weeks. However in the past week she has noted a cough that is somewhat productive unassociated with fever or shortness of breath. Currently, she appears in no acute distress. Ear examination reveals normal-appearing tympanic membranes and ear canals. Oral examination is unremarkable. The neck is free of adenopathy, mass, thyroid enlargement. The chest reveals some congestion but no actual wheezing or rales. The oxygen saturation is 98% with a pulse of 95. Nasal mucosa treated with cotton pledgets  impregnated with Afrin and lidocaine placed in middle meatuses against turbinates. Pledgets left in place for five minutes and removed enabling enhanced visualization. The exam revealed positive edema of mucosa. it was positive for erythema indicative of infection. There was not evidence of deviation of the septum which was not significant. There were not polyps present. .There were not other masses present. The turbinates were not enlarged beyond normal.  Findings are consistent, therefore, with acute sinobronchitis. We will start her on Levaquin along with a Medrol Dosepak and Phenergan with codeine for the cough. She is instructed to hydrate. I have asked that she contact me in 1 week if no significant improvement might occur.

## 2021-01-26 ENCOUNTER — OFFICE VISIT (OUTPATIENT)
Dept: ENT CLINIC | Age: 61
End: 2021-01-26
Payer: COMMERCIAL

## 2021-01-26 VITALS
SYSTOLIC BLOOD PRESSURE: 135 MMHG | WEIGHT: 232 LBS | HEART RATE: 77 BPM | TEMPERATURE: 96.8 F | BODY MASS INDEX: 38.65 KG/M2 | HEIGHT: 65 IN | DIASTOLIC BLOOD PRESSURE: 83 MMHG

## 2021-01-26 DIAGNOSIS — J30.9 ALLERGIC SINUSITIS: ICD-10-CM

## 2021-01-26 DIAGNOSIS — J32.9 RECURRENT SINUSITIS: Primary | ICD-10-CM

## 2021-01-26 PROCEDURE — 3017F COLORECTAL CA SCREEN DOC REV: CPT | Performed by: OTOLARYNGOLOGY

## 2021-01-26 PROCEDURE — 1036F TOBACCO NON-USER: CPT | Performed by: OTOLARYNGOLOGY

## 2021-01-26 PROCEDURE — 99213 OFFICE O/P EST LOW 20 MIN: CPT | Performed by: OTOLARYNGOLOGY

## 2021-01-26 PROCEDURE — G8417 CALC BMI ABV UP PARAM F/U: HCPCS | Performed by: OTOLARYNGOLOGY

## 2021-01-26 PROCEDURE — G8484 FLU IMMUNIZE NO ADMIN: HCPCS | Performed by: OTOLARYNGOLOGY

## 2021-01-26 PROCEDURE — G8427 DOCREV CUR MEDS BY ELIG CLIN: HCPCS | Performed by: OTOLARYNGOLOGY

## 2021-01-26 RX ORDER — METHYLPREDNISOLONE 4 MG/1
4 TABLET ORAL SEE ADMIN INSTRUCTIONS
Qty: 1 KIT | Refills: 0 | Status: SHIPPED | OUTPATIENT
Start: 2021-01-26 | End: 2021-09-20

## 2021-01-26 RX ORDER — AZITHROMYCIN 250 MG/1
TABLET, FILM COATED ORAL
Qty: 1 PACKET | Refills: 0 | Status: SHIPPED | OUTPATIENT
Start: 2021-01-26 | End: 2021-09-20

## 2021-01-26 RX ORDER — FLUTICASONE PROPIONATE 50 MCG
2 SPRAY, SUSPENSION (ML) NASAL DAILY
Qty: 1 BOTTLE | Refills: 2 | Status: SHIPPED | OUTPATIENT
Start: 2021-01-26 | End: 2021-04-23

## 2021-01-26 NOTE — PROGRESS NOTES
Patient relates that she probably had the Covid infection about 6 weeks ago when the rest of her family did. However she has not had any problem until the last week or so when her sinuses became much more congested and draining. She has had no fever. She has had no shortness of breath. She has not been taking any medication for the condition. She does not smoke. Currently, she appears in no obvious acute distress. Ear examination reveals normal appearing tympanic membranes and ear canals bilaterally. Oral examination is unremarkable except for some posterior inflammation consistent with drainage. The neck is free of any adenopathy, mass, thyroid enlargement. Nasal mucosa treated with cotton pledgets  impregnated with Afrin and lidocaine placed in middle meatuses against turbinates. Pledgets left in place for five minutes and removed enabling enhanced visualization. The exam revealed positive edema of mucosa. it was positive for erythema indicative of infection. There was not evidence of deviation of the septum which was not significant. There were not polyps present. .There were not other masses present. The turbinates were not enlarged beyond normal.  Findings are consistent with a sinus infection with associated allergy. We will start her on Flonase nasal spray as well as azithromycin and Medrol Dosepak. I have asked that she contact me in 1 week if symptoms are not improved.

## 2021-02-15 ENCOUNTER — TELEPHONE (OUTPATIENT)
Dept: ENDOCRINOLOGY | Age: 61
End: 2021-02-15

## 2021-02-15 DIAGNOSIS — J32.9 RECURRENT SINUSITIS: Primary | ICD-10-CM

## 2021-02-15 RX ORDER — LEVOFLOXACIN 500 MG/1
500 TABLET, FILM COATED ORAL DAILY
Qty: 10 TABLET | Refills: 0 | Status: SHIPPED | OUTPATIENT
Start: 2021-02-15 | End: 2021-09-20

## 2021-02-15 NOTE — TELEPHONE ENCOUNTER
PT still having sinus issues and she would like to have something called into [facility]. She would like to have a call when this has been sent. CB #[phone].

## 2021-04-23 ENCOUNTER — TELEPHONE (OUTPATIENT)
Dept: ENT CLINIC | Age: 61
End: 2021-04-23

## 2021-04-23 DIAGNOSIS — J30.9 ALLERGIC SINUSITIS: Primary | ICD-10-CM

## 2021-04-23 RX ORDER — FLUTICASONE PROPIONATE 50 MCG
2 SPRAY, SUSPENSION (ML) NASAL DAILY
Qty: 1 BOTTLE | Refills: 3 | Status: SHIPPED | OUTPATIENT
Start: 2021-04-23 | End: 2021-09-20

## 2021-04-23 RX ORDER — FLUTICASONE PROPIONATE 50 MCG
SPRAY, SUSPENSION (ML) NASAL
Qty: 16 G | Refills: 3 | Status: SHIPPED | OUTPATIENT
Start: 2021-04-23 | End: 2021-09-20

## 2021-05-13 ENCOUNTER — OFFICE VISIT (OUTPATIENT)
Dept: ENT CLINIC | Age: 61
End: 2021-05-13
Payer: COMMERCIAL

## 2021-05-13 VITALS — HEART RATE: 79 BPM | DIASTOLIC BLOOD PRESSURE: 91 MMHG | TEMPERATURE: 96.9 F | SYSTOLIC BLOOD PRESSURE: 163 MMHG

## 2021-05-13 DIAGNOSIS — J30.9 ALLERGIC SINUSITIS: Primary | ICD-10-CM

## 2021-05-13 PROCEDURE — 99213 OFFICE O/P EST LOW 20 MIN: CPT | Performed by: OTOLARYNGOLOGY

## 2021-05-13 PROCEDURE — G8427 DOCREV CUR MEDS BY ELIG CLIN: HCPCS | Performed by: OTOLARYNGOLOGY

## 2021-05-13 PROCEDURE — 3017F COLORECTAL CA SCREEN DOC REV: CPT | Performed by: OTOLARYNGOLOGY

## 2021-05-13 PROCEDURE — 1036F TOBACCO NON-USER: CPT | Performed by: OTOLARYNGOLOGY

## 2021-05-13 PROCEDURE — G8417 CALC BMI ABV UP PARAM F/U: HCPCS | Performed by: OTOLARYNGOLOGY

## 2021-05-13 PROCEDURE — 96372 THER/PROPH/DIAG INJ SC/IM: CPT | Performed by: OTOLARYNGOLOGY

## 2021-05-13 RX ORDER — METHYLPREDNISOLONE ACETATE 40 MG/ML
40 INJECTION, SUSPENSION INTRA-ARTICULAR; INTRALESIONAL; INTRAMUSCULAR; SOFT TISSUE ONCE
Status: COMPLETED | OUTPATIENT
Start: 2021-05-13 | End: 2021-05-13

## 2021-05-13 RX ORDER — METHYLPREDNISOLONE 4 MG/1
4 TABLET ORAL SEE ADMIN INSTRUCTIONS
Qty: 1 KIT | Refills: 0 | Status: SHIPPED | OUTPATIENT
Start: 2021-05-13 | End: 2021-09-20

## 2021-05-13 RX ADMIN — METHYLPREDNISOLONE ACETATE 40 MG: 40 INJECTION, SUSPENSION INTRA-ARTICULAR; INTRALESIONAL; INTRAMUSCULAR; SOFT TISSUE at 12:24

## 2021-05-13 NOTE — PROGRESS NOTES
For the past 2 weeks, the patient has been noticing an increase in sinus congestion which seems to be on a yearly basis at this time of the year. She has not been using anything other than Flonase nasal spray. Has had no fever. No purulent drainage has been noted. She has had no difficulty breathing. She does not smoke. Currently, she appears in no obvious acute distress. Ear examination reveals normal-appearing tympanic membranes and ear canals bilaterally. Oral examination is unremarkable. The neck is free of adenopathy, mass, thyroid enlargement. Nasal mucosa treated with cotton pledgets  impregnated with Afrin and lidocaine placed in middle meatuses against turbinates. Pledgets left in place for five minutes and removed enabling enhanced visualization. The exam revealed positive edema of mucosa. it was negative for erythema indicative of infection. There was not evidence of deviation of the septum which was not significant. There were not polyps present. .There were not other masses present. The turbinates were not enlarged beyond normal.  Findings are those of allergic sinusitis. She will continue Flonase and we will add a Medrol Dosepak along with a Depo-Medrol injection to carry her through the season. She will contact me in 7 days if no significant improvement might occur.

## 2021-09-20 ENCOUNTER — HOSPITAL ENCOUNTER (INPATIENT)
Age: 61
LOS: 1 days | Discharge: HOME | DRG: 310 | End: 2021-09-20
Attending: EMERGENCY MEDICINE | Admitting: INTERNAL MEDICINE
Payer: COMMERCIAL

## 2021-09-20 ENCOUNTER — TELEPHONE (OUTPATIENT)
Dept: CARDIOLOGY CLINIC | Age: 61
End: 2021-09-20

## 2021-09-20 ENCOUNTER — APPOINTMENT (OUTPATIENT)
Dept: GENERAL RADIOLOGY | Age: 61
DRG: 310 | End: 2021-09-20
Payer: COMMERCIAL

## 2021-09-20 VITALS
SYSTOLIC BLOOD PRESSURE: 155 MMHG | OXYGEN SATURATION: 96 % | WEIGHT: 232 LBS | TEMPERATURE: 98.2 F | HEIGHT: 65 IN | DIASTOLIC BLOOD PRESSURE: 92 MMHG | RESPIRATION RATE: 20 BRPM | HEART RATE: 77 BPM | BODY MASS INDEX: 38.65 KG/M2

## 2021-09-20 DIAGNOSIS — N17.9 ACUTE KIDNEY INJURY (HCC): ICD-10-CM

## 2021-09-20 DIAGNOSIS — I47.1 PAROXYSMAL SUPRAVENTRICULAR TACHYCARDIA (HCC): ICD-10-CM

## 2021-09-20 DIAGNOSIS — R07.9 ACUTE CHEST PAIN: Primary | ICD-10-CM

## 2021-09-20 PROBLEM — R73.9 HYPERGLYCEMIA: Status: ACTIVE | Noted: 2021-09-20

## 2021-09-20 LAB
ANION GAP SERPL CALCULATED.3IONS-SCNC: 15 MMOL/L (ref 3–16)
BASOPHILS ABSOLUTE: 0 K/UL (ref 0–0.2)
BASOPHILS RELATIVE PERCENT: 0.5 %
BUN BLDV-MCNC: 25 MG/DL (ref 7–20)
CALCIUM SERPL-MCNC: 9.8 MG/DL (ref 8.3–10.6)
CHLORIDE BLD-SCNC: 102 MMOL/L (ref 99–110)
CO2: 21 MMOL/L (ref 21–32)
CREAT SERPL-MCNC: 1.4 MG/DL (ref 0.6–1.2)
D DIMER: <200 NG/ML DDU (ref 0–229)
EKG ATRIAL RATE: 96 BPM
EKG DIAGNOSIS: NORMAL
EKG P AXIS: 68 DEGREES
EKG P-R INTERVAL: 206 MS
EKG Q-T INTERVAL: 362 MS
EKG QRS DURATION: 78 MS
EKG QTC CALCULATION (BAZETT): 457 MS
EKG R AXIS: -27 DEGREES
EKG T AXIS: 72 DEGREES
EKG VENTRICULAR RATE: 96 BPM
EOSINOPHILS ABSOLUTE: 0.1 K/UL (ref 0–0.6)
EOSINOPHILS RELATIVE PERCENT: 1.6 %
GFR AFRICAN AMERICAN: 46
GFR NON-AFRICAN AMERICAN: 38
GLUCOSE BLD-MCNC: 207 MG/DL (ref 70–99)
HCT VFR BLD CALC: 39 % (ref 36–48)
HEMOGLOBIN: 13 G/DL (ref 12–16)
LYMPHOCYTES ABSOLUTE: 2 K/UL (ref 1–5.1)
LYMPHOCYTES RELATIVE PERCENT: 23.5 %
MCH RBC QN AUTO: 27.2 PG (ref 26–34)
MCHC RBC AUTO-ENTMCNC: 33.3 G/DL (ref 31–36)
MCV RBC AUTO: 81.6 FL (ref 80–100)
MONOCYTES ABSOLUTE: 0.3 K/UL (ref 0–1.3)
MONOCYTES RELATIVE PERCENT: 3.3 %
NEUTROPHILS ABSOLUTE: 6.1 K/UL (ref 1.7–7.7)
NEUTROPHILS RELATIVE PERCENT: 71.1 %
PDW BLD-RTO: 15.4 % (ref 12.4–15.4)
PLATELET # BLD: 208 K/UL (ref 135–450)
PMV BLD AUTO: 8.9 FL (ref 5–10.5)
POTASSIUM REFLEX MAGNESIUM: 3.7 MMOL/L (ref 3.5–5.1)
PRO-BNP: 353 PG/ML (ref 0–124)
RBC # BLD: 4.78 M/UL (ref 4–5.2)
SODIUM BLD-SCNC: 138 MMOL/L (ref 136–145)
TROPONIN: <0.01 NG/ML
TSH REFLEX: 3.15 UIU/ML (ref 0.27–4.2)
WBC # BLD: 8.6 K/UL (ref 4–11)

## 2021-09-20 PROCEDURE — 84484 ASSAY OF TROPONIN QUANT: CPT

## 2021-09-20 PROCEDURE — 93005 ELECTROCARDIOGRAM TRACING: CPT | Performed by: EMERGENCY MEDICINE

## 2021-09-20 PROCEDURE — 85025 COMPLETE CBC W/AUTO DIFF WBC: CPT

## 2021-09-20 PROCEDURE — G0378 HOSPITAL OBSERVATION PER HR: HCPCS

## 2021-09-20 PROCEDURE — 2580000003 HC RX 258: Performed by: EMERGENCY MEDICINE

## 2021-09-20 PROCEDURE — 71045 X-RAY EXAM CHEST 1 VIEW: CPT

## 2021-09-20 PROCEDURE — 83036 HEMOGLOBIN GLYCOSYLATED A1C: CPT

## 2021-09-20 PROCEDURE — 80048 BASIC METABOLIC PNL TOTAL CA: CPT

## 2021-09-20 PROCEDURE — 6370000000 HC RX 637 (ALT 250 FOR IP): Performed by: EMERGENCY MEDICINE

## 2021-09-20 PROCEDURE — 84443 ASSAY THYROID STIM HORMONE: CPT

## 2021-09-20 PROCEDURE — 85379 FIBRIN DEGRADATION QUANT: CPT

## 2021-09-20 PROCEDURE — 93010 ELECTROCARDIOGRAM REPORT: CPT | Performed by: INTERNAL MEDICINE

## 2021-09-20 PROCEDURE — 99223 1ST HOSP IP/OBS HIGH 75: CPT | Performed by: INTERNAL MEDICINE

## 2021-09-20 PROCEDURE — 83880 ASSAY OF NATRIURETIC PEPTIDE: CPT

## 2021-09-20 PROCEDURE — 2060000000 HC ICU INTERMEDIATE R&B

## 2021-09-20 PROCEDURE — 99284 EMERGENCY DEPT VISIT MOD MDM: CPT

## 2021-09-20 RX ORDER — SODIUM CHLORIDE 9 MG/ML
25 INJECTION, SOLUTION INTRAVENOUS PRN
Status: CANCELLED | OUTPATIENT
Start: 2021-09-20

## 2021-09-20 RX ORDER — TRAZODONE HYDROCHLORIDE 50 MG/1
25 TABLET ORAL NIGHTLY
Status: CANCELLED | OUTPATIENT
Start: 2021-09-20

## 2021-09-20 RX ORDER — 0.9 % SODIUM CHLORIDE 0.9 %
1000 INTRAVENOUS SOLUTION INTRAVENOUS ONCE
Status: COMPLETED | OUTPATIENT
Start: 2021-09-20 | End: 2021-09-20

## 2021-09-20 RX ORDER — SODIUM CHLORIDE, SODIUM LACTATE, POTASSIUM CHLORIDE, CALCIUM CHLORIDE 600; 310; 30; 20 MG/100ML; MG/100ML; MG/100ML; MG/100ML
INJECTION, SOLUTION INTRAVENOUS CONTINUOUS
Status: DISCONTINUED | OUTPATIENT
Start: 2021-09-20 | End: 2021-09-20 | Stop reason: HOSPADM

## 2021-09-20 RX ORDER — ACETAMINOPHEN 325 MG/1
650 TABLET ORAL EVERY 6 HOURS PRN
Status: DISCONTINUED | OUTPATIENT
Start: 2021-09-20 | End: 2021-09-20 | Stop reason: HOSPADM

## 2021-09-20 RX ORDER — SODIUM CHLORIDE 0.9 % (FLUSH) 0.9 %
5-40 SYRINGE (ML) INJECTION PRN
Status: CANCELLED | OUTPATIENT
Start: 2021-09-20

## 2021-09-20 RX ORDER — ACETAMINOPHEN 650 MG/1
650 SUPPOSITORY RECTAL EVERY 6 HOURS PRN
Status: DISCONTINUED | OUTPATIENT
Start: 2021-09-20 | End: 2021-09-20 | Stop reason: HOSPADM

## 2021-09-20 RX ORDER — ASPIRIN 81 MG/1
324 TABLET, CHEWABLE ORAL ONCE
Status: COMPLETED | OUTPATIENT
Start: 2021-09-20 | End: 2021-09-20

## 2021-09-20 RX ORDER — POLYETHYLENE GLYCOL 3350 17 G/17G
17 POWDER, FOR SOLUTION ORAL DAILY PRN
Status: CANCELLED | OUTPATIENT
Start: 2021-09-20

## 2021-09-20 RX ORDER — SODIUM CHLORIDE 0.9 % (FLUSH) 0.9 %
5-40 SYRINGE (ML) INJECTION EVERY 12 HOURS SCHEDULED
Status: CANCELLED | OUTPATIENT
Start: 2021-09-20

## 2021-09-20 RX ORDER — INSULIN LISPRO 100 [IU]/ML
0-3 INJECTION, SOLUTION INTRAVENOUS; SUBCUTANEOUS NIGHTLY
Status: DISCONTINUED | OUTPATIENT
Start: 2021-09-20 | End: 2021-09-20 | Stop reason: HOSPADM

## 2021-09-20 RX ORDER — ONDANSETRON 4 MG/1
4 TABLET, ORALLY DISINTEGRATING ORAL EVERY 8 HOURS PRN
Status: CANCELLED | OUTPATIENT
Start: 2021-09-20

## 2021-09-20 RX ORDER — ONDANSETRON 2 MG/ML
4 INJECTION INTRAMUSCULAR; INTRAVENOUS EVERY 6 HOURS PRN
Status: CANCELLED | OUTPATIENT
Start: 2021-09-20

## 2021-09-20 RX ORDER — INSULIN LISPRO 100 [IU]/ML
0-6 INJECTION, SOLUTION INTRAVENOUS; SUBCUTANEOUS
Status: DISCONTINUED | OUTPATIENT
Start: 2021-09-20 | End: 2021-09-20 | Stop reason: HOSPADM

## 2021-09-20 RX ADMIN — SODIUM CHLORIDE 1000 ML: 9 INJECTION, SOLUTION INTRAVENOUS at 11:20

## 2021-09-20 RX ADMIN — ASPIRIN 324 MG: 81 TABLET, CHEWABLE ORAL at 11:40

## 2021-09-20 RX ADMIN — METOPROLOL TARTRATE 25 MG: 25 TABLET, FILM COATED ORAL at 11:40

## 2021-09-20 NOTE — ED PROVIDER NOTES
EMERGENCY DEPARTMENT PROVIDER NOTE    Patient Identification  Pt Name: Jessi Browne  MRN: [id number]  Kvnggfshannan 1960  Date of evaluation: 9/20/2021  Provider: Surinder Goldsmith DO  PCP: No primary care provider on file. Chief Complaint  Irregular Heart Beat (Patient in via squad for complaints of SVT rate in 190's. Vagal and 6mg of adensine completed. Patient coverted to rate of 90 upon arrival. Patient denies cardiac history )      HPI  (History provided by patient)  This is a 64 y.o. female with pertinent past medical history of hypertension, high cholesterol, diabetes who was brought in by EMS transportation for fast heart rate. Patient states she was driving when she felt a burning sensation in her central chest and back of her throat, initially felt like GERD however began to worsen and associated with sweating. Nothing seemed to make the symptoms any better or worse. On EMS arrival patient was tachycardic with heart rate in the 190s, appeared to be SVT on 3-lead EKG and was given 6 mg adenosine with resulting conversion to sinus tachycardia. Patient denies any history of similar symptoms. She denies any alcohol or recreational drug use. She denies taking any high-dose caffeine or other stimulants, however did take Aleve cough and cold which contains Sudafed this morning. At time of my evaluation she reports her chest pain has resolved.     ROS    Const:  No fevers, no chills, +generalized weakness  Skin:  No rash, no lesions  Eyes:  No visual changes, no blurry or double vision, no pain  ENT:  No sore throat, no difficulty swallowing, no ear pain, no sinus pain or congestion  Card:  +chest pain (resolved), no palpitations, no edema  Resp:  No shortness of breath, no cough, no wheezing  Abd:  No abdominal pain, no nausea, no vomiting, no diarrhea  Genitourinary:  No dysuria, no hematuria, no vaginal discharge, no vaginal bleeding  MSK:  No joint pain, no myalgia  Neuro:  No focal weakness, no headache, no paresthesia    All other systems reviewed and negative unless otherwise noted in HPI      I have reviewed the following nursing documentation:  Allergies: Erythromycin    Past medical history:   Past Medical History:   Diagnosis Date    Allergic rhinitis     Depression     GERD (gastroesophageal reflux disease)     Heart murmur     Hepatic steatosis     HTN (hypertension)     Hyperlipidemia     Kidney stones     s/p lithotripsy x 1    Obesity     Sensorineural hearing loss (SNHL), bilateral     Type 2 diabetes mellitus ([address]      Past surgical history:   Past Surgical History:   Procedure Laterality Date    COLONOSCOPY  2011    10y    LITHOTRIPSY      LIVER BIOPSY  04/28/2015    fatty liver    SALIVARY GLAND SURGERY      L parotid gland removal- benign    SLEEVE GASTRECTOMY  4/28/15    UPPER GASTROINTESTINAL ENDOSCOPY  3/13/15       Home medications:   Previous Medications    AMOXICILLIN-CLAVULANATE (AUGMENTIN) 875-125 MG PER TABLET    Take 1 tablet by mouth 2 times daily    AZITHROMYCIN (ZITHROMAX) 250 MG TABLET    Dispense one 5 day supply pack and take as directed    FLUTICASONE (FLONASE) 50 MCG/ACT NASAL SPRAY    USE 2 SPRAYS BY NASAL ROUTE DAILY    FLUTICASONE (FLONASE) 50 MCG/ACT NASAL SPRAY    SHAKE LIQUID AND USE 2 SPRAYS IN EACH NOSTRIL DAILY    FLUTICASONE (FLONASE) 50 MCG/ACT NASAL SPRAY    2 sprays by Nasal route daily    LEVOFLOXACIN (LEVAQUIN) 500 MG TABLET    Take 1 tablet by mouth daily    LEVOFLOXACIN (LEVAQUIN) 500 MG TABLET    Take 1 tablet by mouth daily    METHYLPREDNISOLONE (MEDROL, KRISTINE,) 4 MG TABLET    Take 1 tablet by mouth See Admin Instructions Take by mouth. METHYLPREDNISOLONE (MEDROL, KRISTINE,) 4 MG TABLET    Take 1 tablet by mouth See Admin Instructions Take by mouth. METHYLPREDNISOLONE (MEDROL, KRISTINE,) 4 MG TABLET    Take 1 tablet by mouth See Admin Instructions Take by mouth.     METHYLPREDNISOLONE (MEDROL, KRISTINE,) 4 MG TABLET    Take 1 tablet by mouth See Admin Instructions Take by mouth. MONTELUKAST (SINGULAIR) 10 MG TABLET    TAKE 1 TABLET BY MOUTH EVERY NIGHT AT BEDTIME    PSEUDOEPHEDRINE-NAPROXEN NA (ALEVE-D SINUS & COLD PO)    Take by mouth    SERTRALINE (ZOLOFT) 100 MG TABLET    125 mg     TRAZODONE (DESYREL) 100 MG TABLET    Take 100 mg by mouth nightly Taking 0.25 tab    VALSARTAN-HYDROCHLOROTHIAZIDE (DIOVAN-HCT) 320-25 MG PER TABLET    Take 1 tablet by mouth daily       Social history:  reports that she quit smoking about 29 years ago. She has a 10.00 pack-year smoking history. She has never used smokeless tobacco. She reports current alcohol use. She reports that she does not use drugs. Family history:    Family History   Problem Relation Age of Onset    High Blood Pressure Mother     High Cholesterol Mother     Asthma Mother     Heart Disease Father 72    High Blood Pressure Father     High Cholesterol Father     Other Father         kidney stones    Other Brother         kidney stones    Rheum Arthritis Brother     Depression Maternal Grandmother     Rheum Arthritis Maternal Grandmother          Exam  ED Triage Vitals   BP Temp Temp Source Pulse Resp SpO2 Height Weight   09/20/21 0915 09/20/21 0923 09/20/21 0923 09/20/21 0915 09/20/21 0923 09/20/21 0915 09/20/21 0923 09/20/21 0923   (!) 173/88 98.2 °F (36.8 °C) Oral 97 20 94 % 5' 5\" (1.651 m) 232 lb (105.2 kg)     Nursing note and vitals reviewed. Constitutional: Well developed, well nourished. Non-toxic in appearance. BMI 38.61  HENT:      Head: Normocephalic and atraumatic. Ears: External ears normal.      Nose: Nose normal.     Mouth: Membrane mucosa moist and pink. Eyes: Anicteric sclera. No discharge. Neck: Supple. Trachea midline. Cardiovascular: RRR; no murmurs, rubs, or gallops. No lower extremity edema. Pulmonary/Chest: Effort normal. No respiratory distress. CTAB. No stridor. No wheezes. No rales. Abdominal: Soft. No distension.   Nontender to deep palpation all quadrants. Musculoskeletal: Moves all extremities. No gross deformity. Neurological: Alert and oriented. Face symmetric. Speech is clear. Skin: Warm and dry. No rash. Psychiatric: Normal mood and affect. Behavior is normal.    Procedures      EKG    EKG was reviewed by emergency department physician in the absence of a cardiologist    Narrow complex sinus rhythm, rate 96, normal axis, normal ID and QRS intervals, normal Qtc, no ST elevations or depressions, normal t-wave morphology, impression NSR, no STEMI      Radiology  XR CHEST PORTABLE   Final Result   No evidence of pulmonary edema.              Labs  Results for orders placed or performed during the hospital encounter of 09/20/21   CBC Auto Differential   Result Value Ref Range    WBC 8.6 4.0 - 11.0 K/uL    RBC 4.78 4.00 - 5.20 M/uL    Hemoglobin 13.0 12.0 - 16.0 g/dL    Hematocrit 39.0 36.0 - 48.0 %    MCV 81.6 80.0 - 100.0 fL    MCH 27.2 26.0 - 34.0 pg    MCHC 33.3 31.0 - 36.0 g/dL    RDW 15.4 12.4 - 15.4 %    Platelets 242 772 - 106 K/uL    MPV 8.9 5.0 - 10.5 fL    Neutrophils % 71.1 %    Lymphocytes % 23.5 %    Monocytes % 3.3 %    Eosinophils % 1.6 %    Basophils % 0.5 %    Neutrophils Absolute 6.1 1.7 - 7.7 K/uL    Lymphocytes Absolute 2.0 1.0 - 5.1 K/uL    Monocytes Absolute 0.3 0.0 - 1.3 K/uL    Eosinophils Absolute 0.1 0.0 - 0.6 K/uL    Basophils Absolute 0.0 0.0 - 0.2 K/uL   Basic Metabolic Panel w/ Reflex to MG   Result Value Ref Range    Sodium 138 136 - 145 mmol/L    Potassium reflex Magnesium 3.7 3.5 - 5.1 mmol/L    Chloride 102 99 - 110 mmol/L    CO2 21 21 - 32 mmol/L    Anion Gap 15 3 - 16    Glucose 207 (H) 70 - 99 mg/dL    BUN 25 (H) 7 - 20 mg/dL    CREATININE 1.4 (H) 0.6 - 1.2 mg/dL    GFR Non- 38 (A) >60    GFR  46 (A) >60    Calcium 9.8 8.3 - 10.6 mg/dL   Troponin   Result Value Ref Range    Troponin <0.01 <0.01 ng/mL   Brain Natriuretic Peptide   Result Value Ref Range    Pro- (H) 0 - 124 pg/mL D-dimer, quantitative   Result Value Ref Range    D-Dimer, Quant <200 0 - 229 ng/mL DDU   EKG 12 Lead   Result Value Ref Range    Ventricular Rate 96 BPM    Atrial Rate 96 BPM    P-R Interval 206 ms    QRS Duration 78 ms    Q-T Interval 362 ms    QTc Calculation (Bazett) 457 ms    P Axis 68 degrees    R Axis -27 degrees    T Axis 72 degrees    Diagnosis       Normal sinus rhythmMinimal voltage criteria for LVH, may be normal variantInferior infarct , age undeterminedAnteroseptal infarct , age undeterminedAbnormal ECG       Screenings           MDM and ED Course    Patient afebrile and nontoxic. No distress. Prehospital EKGs from EMS were reviewed and consistent with SVT, terminated with 6 mg adenosine given in the field prior to arrival.  On arrival here patient in sinus rhythm, EKG shows no evidence of acute ischemia or malignant dysrhythmia. D-dimer normal, low risk for pulmonary embolism. Laboratory work-up demonstrates no clinically significant electrolyte derangement. No findings to suggest infection, patient is not septic. Labs do show acute kidney injury with creatinine 1.4, baseline 0.8-1.0. BNP is trivially elevated, clinically patient without symptoms of CHF exacerbation. Patient does report atypical chest pain with her SVT, she has multiple risk factors for CAD, in conjunction with her acute kidney injury felt prudent to admit for further evaluation and care, ACS rule out. Plan was discussed with patient and her daughter and they are agreeable. Case discussed with internal medicine team who will admit for further evaluation and care. Final Impression  1. Acute chest pain    2. Paroxysmal supraventricular tachycardia ([address]    3. Acute kidney injury ([address]        Blood pressure (!) 182/95, pulse 94, temperature 98.2 °F (36.8 °C), temperature source Oral, resp. rate 20, height 5' 5\" (1.651 m), weight 232 lb (105.2 kg), last menstrual period 07/27/2012, SpO2 96 %, not currently breastfeeding. Disposition:  DISPOSITION Decision To Admit 09/20/2021 11:33:51 AM      Patient Referrals:  No follow-up provider specified. Discharge Medications:  New Prescriptions    No medications on file       Discontinued Medications:  Discontinued Medications    No medications on file       This chart was generated using the [employer] [facility] dictation system. I created this record but it may contain dictation errors given the limitations of this technology.     Natalie Gill DO (electronically signed)  Attending Emergency Physician       Natalie Gill DO  09/20/21 [id number]

## 2021-09-20 NOTE — TELEPHONE ENCOUNTER
----- Message from Lashell Lugo RN sent at 9/20/2021  4:17 PM EDT -----  Regarding: Add to call back list  Please add Natalia Mustafa 1960 to the call back list for either EP NP for early next year. She came in to the ED with SVT and wants to talk to them about ablation in the future.   Thank you

## 2021-09-20 NOTE — CONSULTS
[address]   Electrophysiology Consultation   Date: 9/20/2021  Reason for Consultation: SVT   Consult Requesting Physician: Esther Anderson MD   Chief Complaint   Patient presents with    Irregular Heart Beat     Patient in via squad for complaints of SVT rate in 190's. Vagal and 6mg of adensine completed. Patient coverted to rate of 90 upon arrival. Patient denies cardiac history        CC: palpitation    HPI: Cleopatra Velasquez is a 64 y.o. female past medical history significant for hypertension, hyperlipidemia, history of morbid obesity status post gastric bypass, prediabetes who presented to the hospital with palpitation. Patient was home when suddenly noticed palpitation. He checked her heart rate was fast.  She stood up and felt dizziness and lightheadedness. She also has some sweating. Denies having any chest pain or shortness of breath. EMS was called. She was found to be in SVT with heart rate 190 bpm vagal maneuvers failed and she received 6 mg adenosine which converted her back to sinus rhythm. Patient does have history of palpitation for years. She started having palpitation years ago, sudden onset and she usually can stop it by resting or eating spicy mustard. She has not tried vagal maneuvers before. Initially it was attributed to her GERD. She also has had full cardiac work-up in the past due to abnormal EKG. She states that she had abnormal EKG which showed possible heart attack before. Saw cardiologist and did a stress test and echocardiogram which they were all normal.  Reports no exertional symptoms at this time. Assessment and plan:     - SVT   ECG tracing from EMS reviewed, showed narrow complex tachycardia with short RP. It terminated with 6 mg adenosine. Differential diagnosis include AVNRT. Symptomatic with tachycardia. Recurrent episodes. I discussed SVT, mechanism, physiology, signs and symptoms with patient in details.    I discussed her treatment options including EP study and ablation, medical therapy with beta-blocker or calcium channel blockers, and vagal maneuvers. I recommend that the EP study and ablation. The risks, benefits and alternatives of the ablation procedure were discussed with the patient. The risks including, but not limited to, the risks of bleeding, infection, radiation exposure, injury to vascular, cardiac and surrounding structures (including pneumothorax), stroke, cardiac perforation, tamponade, need for emergent open heart surgery, need for pacemaker implantation, Injury to the phrenic nerve, injury to the esophagus, myocardial infarction and death were discussed in detail. Rare need for pacemaker implantation discussed. Patient would like to discuss this further with her  and will let us know. Contact information for arranging outpatient ablation and office visits were given. - HTN: Not controlled. Add metoprolol 25 mg twice daily.    - HLD on statin. Stable. - Obesity: Body mass index is 38.61 kg/m². Weight loss. Discussed with nursing staff. Active Hospital Problems    Diagnosis Date Noted    SVT (supraventricular tachycardia) ([facility] [address] [I47.1] 2021       Diagnostic studies:     ECG: sinus rhythm, prolonged LA interval, Poor R Wave progression   ECG from EMS:    SVT with short RP interval.     CXR: Normal.   I independently reviewed the cardiac diagnostic studies, ECG and relevant imaging studies. Physical Examination:  Vitals:    21 1230   BP: (!) 155/92   Pulse: 77   Resp:    Temp:    SpO2: 96%      No intake/output data recorded.    Wt Readings from Last 3 Encounters:   21 232 lb (105.2 kg)   21 232 lb (105.2 kg)   20 235 lb (106.6 kg)     Temp  Av.2 °F (36.8 °C)  Min: 98.2 °F (36.8 °C)  Max: 98.2 °F (36.8 °C)  Pulse  Av.2  Min: 77  Max: 103  BP  Min: 139/80  Max: 182/95  SpO2  Av.2 %  Min: 92 %  Max: 99 %  No intake or output data in the 24 hours ending 09/20/21 1513      I independently reviewed all cardiac tracing from cardiac telemetry. · Constitutional: Oriented. No distress. · Head: Normocephalic and atraumatic. · Mouth/Throat: Oropharynx is clear and moist.   · Eyes: Conjunctivae normal. EOM are normal.   · Neck: Neck supple. No JVD present. · Cardiovascular: Normal rate, regular rhythm, S1&S2. · Pulmonary/Chest: Bilateral respiratory sounds. No rhonchi. · Abdominal: Soft. No tenderness. + obese   · Musculoskeletal: No tenderness. No edema    · Lymphadenopathy: Has no cervical adenopathy. · Neurological: Alert and oriented. Follows command, No Gross deficit   · Skin: Skin is warm, No rash noted. · Psychiatric: Has a normal behavior       Scheduled Meds:   metoprolol tartrate  25 mg Oral BID    insulin lispro  0-6 Units SubCUTAneous TID WC    insulin lispro  0-3 Units SubCUTAneous Nightly     Continuous Infusions:   lactated ringers       PRN Meds:.perflutren lipid microspheres, acetaminophen **OR** acetaminophen     Review of System:  [x] Full ROS obtained and negative except as mentioned in HPI    Prior to Admission medications    Medication Sig Start Date End Date Taking?  Authorizing Provider   Pseudoephedrine-Naproxen Na (ALEVE-D SINUS & COLD PO) Take by mouth   Yes Historical Provider, MD   traZODone (DESYREL) 100 MG tablet Take 25 mg by mouth nightly    Yes Historical Provider, MD   sertraline (ZOLOFT) 100 MG tablet 125 mg  10/15/17  Yes Historical Provider, MD       Past Medical History:   Diagnosis Date    Allergic rhinitis     Depression     GERD (gastroesophageal reflux disease)     Heart murmur     Hepatic steatosis     HTN (hypertension)     Hyperlipidemia     Kidney stones     s/p lithotripsy x 1    Obesity     Sensorineural hearing loss (SNHL), bilateral     Type 2 diabetes mellitus ([facility] [address]         Past Surgical History:   Procedure Laterality Date    COLONOSCOPY  2011    10y    LITHOTRIPSY      LIVER BIOPSY  04/28/2015    fatty liver    SALIVARY GLAND SURGERY      L parotid gland removal- benign    SLEEVE GASTRECTOMY  4/28/15    UPPER GASTROINTESTINAL ENDOSCOPY  3/13/15       Allergies   Allergen Reactions    Erythromycin Nausea And Vomiting       Social History:  Reviewed. reports that she quit smoking about 29 years ago. She has a 10.00 pack-year smoking history. She has never used smokeless tobacco. She reports current alcohol use. She reports that she does not use drugs. Family History:  Reviewed. Reviewed. No family history of SCD. Relevant and available labs, and cardiovascular diagnostics reviewed. Reviewed. Recent Labs     09/20/21  0920      K 3.7      CO2 21   BUN 25*   CREATININE 1.4*     Recent Labs     09/20/21  0920   WBC 8.6   HGB 13.0   HCT 39.0   MCV 81.6        Estimated Creatinine Clearance: 51 mL/min (A) (based on SCr of 1.4 mg/dL (H)). No results found for: BNP    I independently reviewed all cardiac tracing from cardiac telemetry. I independently reviewed relevant and available cardiac diagnostic tests ECG, CXR, Echo, Stress test, Device interrogation, Holter, CT scan. Outside medical records via Care everywhere reviewed and summarized in H&P above. Severe exacerbation of underlying medical condition requiring hospitalization and at risk of decompensation. All questions and concerns were addressed to the patient/family. Alternatives to my treatment were discussed. I have discussed the above stated plan and the patient verbalized understanding and agreed with the plan. NOTE: This report was transcribed using voice recognition software. Every effort was made to ensure accuracy, however, inadvertent computerized transcription errors may be present.      Mina Zaidi MD, MPH  [place] [address]   Office: [phone]  Fax: [phone]

## 2021-09-20 NOTE — ED NOTES
Bed: 02  Expected date:   Expected time:   Means of arrival: [facility]  Comments:  Dayron Goodson, [facility]  09/20/21 7541

## 2021-09-20 NOTE — ED NOTES
Patient resting in bed. Denies pain. Daughter at bedside. Awaiting results at this time.       Isael Sharif RN  09/20/21 1677

## 2021-09-20 NOTE — DISCHARGE SUMMARY
[facility] [facility]    Patient Demographics    Patient. Sukumar Herrmann  Date of Birth. 1960  MRN. [id number]     Primary care provider. No primary care provider on file. (Tel: None)    Admit date: 9/20/2021    Discharge date (blank if same as Note Date): Note Date: 9/20/2021     Reason for Hospitalization. Chief Complaint   Patient presents with    Irregular Heart Beat     Patient in via squad for complaints of SVT rate in 190's. Vagal and 6mg of adensine completed. Patient coverted to rate of 90 upon arrival. Patient denies cardiac history          Significant Findings. Active Problems:    Hypertension    Obesity (BMI 30-39. 9)    SVT (supraventricular tachycardia) ([facility])    MYESHA (acute kidney injury) ([facility] [address]    Hyperglycemia  Resolved Problems:    * No resolved hospital problems. *       Problems and results from this hospitalization that need follow up. 1. SVT  2. HTN  3. Hyperglycemia  4. MYESHA    Significant test results and incidental findings. 1.   XR CHEST PORTABLE   Final Result   No evidence of pulmonary edema. Invasive procedures and treatments. 1. None     Problem-based Hospital Course. Sukumar Herrmann is a 64 y.o. female with prior history of hypertension hyperlipidemia, prediabetes (currently off all medications, following sleeve gastrectomy about 5 years ago), GERD and Anxiety/Insomnia brought in by [employer] with complaints of palpitations. She was driving her grandson to school this morning when she started having burning sensation in the throat and midsternal area with palpitations, dizziness & diaphoresis. Heart rate was noted to be in the 170s on her phone rozina and EMS was called. On EMS arrival, she was noted to be in SVT with a rate of 190s. She failed vagal maneuvers and adenosine 6 mg was given.   She had converted to sinus rhythm with a rate of 90 bpm upon arrival to the ED.     Patient endorses recent upper respiratory tract symptoms with congestion for which she has been taking Aleve (sinus and cold) denied any fevers, nausea/vomiting, diarrhea, chest pain, dyspnea or leg pain or swelling. She mentions prior history of palpitations for which she had never sought any medical evaluation.     In the ED, converted to NSR. Initial troponin was negative, D-dimer < 200. She was given metoprolol p.o. 25 mg and admitted for further evaluation.       SVT:  Failed vagal maneuvers. Resolved with adenosine 6 mg. Given metoprolol 25 mg x 1 in the ED. Currently in NSR. TSH pending. D-dimer < 200 with no suspicion for PE. Echo ordered. Cardiology consulted: recommended EP study and ablation but patient declined at this time requesting more time to discuss with spouse. Started on metoprolol 25 mg twice daily. Contact information for arranging outpatient ablation and office visits were given.     GERD: Continue PPI.     History of prediabetes with hyperglycemia:  A1c pending. Further management OP with PCP.        MYESHA: Most likely prerenal.  Given IV fluid. Advised to ensure adequate hydration with OP follow up with PCP in 2-3 days for repeat BMP. Advised to avoid NSAID's.      Hypertension: Will follow up with PCP. Started on Metoprolol. Advised to avoid OTC cold medications. Low salt diet. Consults. IP CONSULT TO HOSPITALIST  IP CONSULT TO CARDIOLOGY    Physical examination on discharge day. BP (!) 155/92   Pulse 77   Temp 98.2 °F (36.8 °C) (Oral)   Resp 20   Ht 5' 5\" (1.651 m)   Wt 232 lb (105.2 kg)   LMP 07/27/2012   SpO2 96%   BMI 38.61 kg/m²   General appearance. Alert. Looks comfortable. HEENT. Sclera clear. Moist mucus membranes. Cardiovascular. Regular rate and rhythm, normal S1, S2. No murmur. Respiratory. Not using accessory muscles. Clear to auscultation bilaterally, no wheeze. Gastrointestinal. Abdomen soft, non-tender, not distended, normal bowel sounds  Neurology. Facial symmetry. No speech deficits.  Moving all extremities equally. Extremities. No edema in lower extremities. Skin. Warm, dry, normal turgor        Condition at time of discharge: Stable. Medication instructions provided to patient at discharge. Medication List      START taking these medications    metoprolol tartrate 25 MG tablet  Commonly known as: LOPRESSOR  Take 1 tablet by mouth 2 times daily        CONTINUE taking these medications    sertraline 100 MG tablet  Commonly known as: ZOLOFT     traZODone 100 MG tablet  Commonly known as: DESYREL        STOP taking these medications    ALEVE-D SINUS & COLD PO     amoxicillin-clavulanate 875-125 MG per tablet  Commonly known as: Augmentin     azithromycin 250 MG tablet  Commonly known as: Zithromax     fluticasone 50 MCG/ACT nasal spray  Commonly known as: FLONASE     levoFLOXacin 500 MG tablet  Commonly known as: Levaquin     methylPREDNISolone 4 MG tablet  Commonly known as: MEDROL (KRISTINE)     montelukast 10 MG tablet  Commonly known as: SINGULAIR     valsartan-hydroCHLOROthiazide 320-25 MG per tablet  Commonly known as: DIOVAN-HCT           Where to Get Your Medications      These medications were sent to [facility] #[id number] [address], [address] [place] [address] [place]    Hours: 24-hours Phone: [phone]   · metoprolol tartrate 25 MG tablet         Discharge recommendations given to patient. Follow Up. With PCP and Cardiologist within  in 1 week   Disposition. home  Activity. activity as tolerated  Diet: ADULT DIET; Regular; 4 carb choices (60 gm/meal); Low Fat/Low Chol/High Fiber/2 gm Na      Spent 35 minutes in discharge process.     Signed:  Esther Anderson MD     9/20/2021 4:36 PM

## 2021-09-20 NOTE — H&P
HOSPITALISTS HISTORY AND PHYSICAL    9/20/2021 12:44 PM    Patient Information:  Adrienne Alvarez is a 64 y.o. female [id number]  PCP:  No primary care provider on file. (Tel: None )    Chief complaint:    Chief Complaint   Patient presents with    Irregular Heart Beat     Patient in via squad for complaints of SVT rate in 190's. Vagal and 6mg of adensine completed. Patient coverted to rate of 90 upon arrival. Patient denies cardiac history        History of Present Illness:  Chico Shankar is a 64 y.o. female with prior history of hypertension hyperlipidemia, prediabetes (currently off all medications, following sleeve gastrectomy about 5 years ago), GERD and Anxiety/Insomnia brought in by squad with complaints of palpitations. She was driving her grandson to school this morning when she started having burning sensation in the throat and midsternal area with palpitations, dizziness & diaphoresis. Heart rate was noted to be in the 170s on her phone rozina and EMS was called. On EMS arrival, she was noted to be in SVT with a rate of 190s. She failed vagal maneuvers and adenosine 6 mg was given. She had converted to sinus rhythm with a rate of 90 bpm upon arrival to the ED. Patient endorses recent upper respiratory tract symptoms with congestion for which she has been taking Aleve (sinus and cold) denied any fevers, nausea/vomiting, diarrhea, chest pain, dyspnea or leg pain or swelling. She mentions prior history of palpitations for which she had never sought any medical evaluation. In the ED, converted to NSR. Initial troponin was negative, D-dimer < 200. She was given metoprolol p.o. 25 mg and admitted for further evaluation. History obtained from patient.        REVIEW OF SYSTEMS:   Constitutional: Negative for fever,chills or night sweats  ENT: Negative for rhinorrhea, epistaxis, take as directed 1 packet 0    methylPREDNISolone (MEDROL, KRISTINE,) 4 MG tablet Take 1 tablet by mouth See Admin Instructions Take by mouth. 1 kit 0    traZODone (DESYREL) 100 MG tablet Take 100 mg by mouth nightly Taking 0.25 tab      levoFLOXacin (LEVAQUIN) 500 MG tablet Take 1 tablet by mouth daily (Patient not taking: Reported on 1/26/2021) 10 tablet 0    methylPREDNISolone (MEDROL, KRISTINE,) 4 MG tablet Take 1 tablet by mouth See Admin Instructions Take by mouth. (Patient not taking: Reported on 1/26/2021) 1 kit 0    amoxicillin-clavulanate (AUGMENTIN) 875-125 MG per tablet Take 1 tablet by mouth 2 times daily (Patient not taking: Reported on 11/3/2020) 20 tablet 0    methylPREDNISolone (MEDROL, KRISTINE,) 4 MG tablet Take 1 tablet by mouth See Admin Instructions Take by mouth. (Patient not taking: Reported on 11/3/2020) 1 kit 0    montelukast (SINGULAIR) 10 MG tablet TAKE 1 TABLET BY MOUTH EVERY NIGHT AT BEDTIME (Patient not taking: Reported on 11/3/2020) 30 tablet 1    fluticasone (FLONASE) 50 MCG/ACT nasal spray USE 2 SPRAYS BY NASAL ROUTE DAILY (Patient not taking: Reported on 11/3/2020) 1 Bottle 3    valsartan-hydrochlorothiazide (DIOVAN-HCT) 320-25 MG per tablet Take 1 tablet by mouth daily (Patient not taking: Reported on 11/3/2020) 90 tablet 2       Allergies: Allergies   Allergen Reactions    Erythromycin Nausea And Vomiting        Social History:  Patient Lives family. reports that she quit smoking about 29 years ago. She has a 10.00 pack-year smoking history. She has never used smokeless tobacco. She reports current alcohol use. She reports that she does not use drugs. Family History:  family history includes Asthma in her mother; Depression in her maternal grandmother; Heart Disease (age of onset: 72) in her father; High Blood Pressure in her father and mother; High Cholesterol in her father and mother; Other in her brother and father; Rheum Arthritis in her brother and maternal grandmother. Physical Exam:  BP (!) 155/92   Pulse 77   Temp 98.2 °F (36.8 °C) (Oral)   Resp 20   Ht 5' 5\" (1.651 m)   Wt 232 lb (105.2 kg)   LMP 07/27/2012   SpO2 96%   BMI 38.61 kg/m²     General appearance:  Appears comfortable. Well nourished  Eyes: Sclera clear, pupils equal  ENT: Moist mucus membranes, no thrush. Trachea midline. Cardiovascular: Regular rhythm, normal S1, S2. Murmur +, No gallop, rub. No edema in lower extremities  Respiratory: Clear to auscultation bilaterally, no wheeze, good inspiratory effort  Gastrointestinal: Abdomen soft, non-tender, not distended, normal bowel sounds  Musculoskeletal: No cyanosis in digits, neck supple  Neurology: Cranial nerves grossly intact. Alert and oriented in time, place and person. No speech or motor deficits  Psychiatry: Appropriate affect. Not agitated  Skin: Warm, dry, normal turgor, no rash  Brisk capillary refill, peripheral pulses palpable   Labs:  CBC:   Lab Results   Component Value Date    WBC 8.6 09/20/2021    RBC 4.78 09/20/2021    HGB 13.0 09/20/2021    HCT 39.0 09/20/2021    MCV 81.6 09/20/2021    MCH 27.2 09/20/2021    MCHC 33.3 09/20/2021    RDW 15.4 09/20/2021     09/20/2021    MPV 8.9 09/20/2021     BMP:    Lab Results   Component Value Date     09/20/2021    K 3.7 09/20/2021     09/20/2021    CO2 21 09/20/2021    BUN 25 09/20/2021    CREATININE 1.4 09/20/2021    CALCIUM 9.8 09/20/2021    GFRAA 46 09/20/2021    GFRAA >60 11/26/2012    LABGLOM 38 09/20/2021    GLUCOSE 207 09/20/2021     XR CHEST PORTABLE   Final Result   No evidence of pulmonary edema. Chest Xray:   EKG:    I visualized CXR images and EKG strips. .      Problem List  Active Problems:    SVT (supraventricular tachycardia) ([facility])  Resolved Problems:    * No resolved hospital problems. *        Assessment/Plan:     SVT:  Failed vagal maneuvers. Resolved with adenosine 6 mg. Given metoprolol 25 mg x 1 in the ED. Currently in NSR. TSH pending.  D-dimer < 200 with no suspicion for PE. Follow-up echo. Cardiology consulted. Continue metoprolol 25 mg twice daily. GERD: Continue PPI. History of prediabetes with hyperglycemia:  Follow-up A1c. Monitor fingersticks with low-dose CDI. ADA diet. MYESHA: Most likely prerenal.  Gentle hydration with IV fluid. Follow-up repeat BMP in the a.m. Paulo Major DVT prophylaxis: Lovenox. Code status: Full code. Diet: Regular diet. IV access: Peripheral.  Bolden Catheter: None. Admit as Observation. I anticipate hospitalization spanning less than two midnights for investigation and treatment of the above medically necessary diagnoses. Please note that some part of this chart was generated using Dragon dictation software. Although every effort was made to ensure the accuracy of this automated transcription, some errors in transcription may have occurred inadvertently. If you may need any clarification, please do not hesitate to contact me through [facility].        Cecy Hudson MD    9/20/2021 12:44 PM

## 2021-09-20 NOTE — ED NOTES
Pharmacy Medication History Note      List of current medications patient is taking is complete. Source of information: Patient     Changes made to medication list:  Medications flagged for removal (include reason, ex. noncompliance):  None     Medications removed (include reason, ex. therapy complete or physician discontinued):  See list below (not on any antibiotics or steroids)    Medications added/doses adjusted:  Trazodone 100 mg tablet - pt takes 25 mg per night by cutting tablet into 4 pieces  Aleve cold and sinus - pt takes as needed for allergy     Other notes (ex. Recent course of antibiotics, Coumadin dosing):  None   Denies use of other OTC or herbal medications. Last dose times updated. No current facility-administered medications on file prior to encounter. Current Outpatient Medications on File Prior to Encounter   Medication Sig Dispense Refill    Pseudoephedrine-Naproxen Na (ALEVE-D SINUS & COLD PO) Take by mouth      traZODone (DESYREL) 100 MG tablet Take 25 mg by mouth nightly       sertraline (ZOLOFT) 100 MG tablet 125 mg       [DISCONTINUED] methylPREDNISolone (MEDROL, KRISTINE,) 4 MG tablet Take 1 tablet by mouth See Admin Instructions Take by mouth. 1 kit 0    [DISCONTINUED] fluticasone (FLONASE) 50 MCG/ACT nasal spray SHAKE LIQUID AND USE 2 SPRAYS IN EACH NOSTRIL DAILY 16 g 3    [DISCONTINUED] fluticasone (FLONASE) 50 MCG/ACT nasal spray 2 sprays by Nasal route daily 1 Bottle 3    [DISCONTINUED] levoFLOXacin (LEVAQUIN) 500 MG tablet Take 1 tablet by mouth daily (Patient not taking: Reported on 5/13/2021) 10 tablet 0    [DISCONTINUED] azithromycin (ZITHROMAX) 250 MG tablet Dispense one 5 day supply pack and take as directed 1 packet 0    [DISCONTINUED] methylPREDNISolone (MEDROL, KRISTINE,) 4 MG tablet Take 1 tablet by mouth See Admin Instructions Take by mouth.  1 kit 0    [DISCONTINUED] levoFLOXacin (LEVAQUIN) 500 MG tablet Take 1 tablet by mouth daily (Patient not taking: Reported on 1/26/2021) 10 tablet 0    [DISCONTINUED] methylPREDNISolone (MEDROL, KRISTINE,) 4 MG tablet Take 1 tablet by mouth See Admin Instructions Take by mouth. (Patient not taking: Reported on 1/26/2021) 1 kit 0    [DISCONTINUED] amoxicillin-clavulanate (AUGMENTIN) 875-125 MG per tablet Take 1 tablet by mouth 2 times daily (Patient not taking: Reported on 11/3/2020) 20 tablet 0    [DISCONTINUED] methylPREDNISolone (MEDROL, KRISTINE,) 4 MG tablet Take 1 tablet by mouth See Admin Instructions Take by mouth.  (Patient not taking: Reported on 11/3/2020) 1 kit 0    [DISCONTINUED] montelukast (SINGULAIR) 10 MG tablet TAKE 1 TABLET BY MOUTH EVERY NIGHT AT BEDTIME (Patient not taking: Reported on 11/3/2020) 30 tablet 1    [DISCONTINUED] fluticasone (FLONASE) 50 MCG/ACT nasal spray USE 2 SPRAYS BY NASAL ROUTE DAILY (Patient not taking: Reported on 11/3/2020) 1 Bottle 3    [DISCONTINUED] valsartan-hydrochlorothiazide (DIOVAN-HCT) 320-25 MG per tablet Take 1 tablet by mouth daily (Patient not taking: Reported on 11/3/2020) 90 tablet 2     Angely Ng, PharmD    PGY1 Pharmacy Resident   [id number]

## 2021-09-20 NOTE — ED NOTES
Pt alert and oriented, no complaints of chest pain at this time, heart rate in the 90's, murmur noted. Cap refill less than three seconds, radial pulse 2+, no signs of edema or JVD and lungs sounds clear. Patient was in the car and began to feel dizzy. Upon arrival of squad patients heart rate was 190. Vagal maneuvers attempted and 6mg of adenosine administered. Upon arrival to hospital patient was in NSR. Patient denies cardiac hx. States she has been taking some antihistamines for congestion. Dr. Cecilia Yanez at bedside. Blood obtained and sent to lab. EKG completed. IV in left AC. Fluids given in route and stopped upon arrival. Patient denies needs at this time.       Keon Bello RN  09/20/21 [id number] [facility] Daily Lafleur RN  09/20/21 [id number]

## 2021-09-21 LAB
ESTIMATED AVERAGE GLUCOSE: 119.8 MG/DL
HBA1C MFR BLD: 5.8 %

## 2021-09-29 NOTE — PROGRESS NOTES
Physician Progress Note      Eduardo Chandler  [facility] #:                  [id number]  :                       1960  ADMIT DATE:       2021 9:09 AM  DISCH DATE:        2021 5:43 PM  RESPONDING  PROVIDER #:        Luis Barbosa          QUERY TEXT:    Patient admitted with SVT. Noted documentation of Acute Kidney Injury in the   H&P and discharge summary. In order to support the diagnosis of MYESHA, please   include additional clinical indicators in your documentation. Or please   document if the diagnosis of MYESHA has been ruled out after further study    The medical record reflects the following:  Risk Factors: SVT  Clinical Indicators: Pt admitted with SVT, creatinine 1.4 on admission without   any other results since 2017 of 0.7  Treatment: IVF    Defined by Kidney Disease Improving Global Outcomes (KDIGO) clinical practice   guideline for acute kidney injury:  -Increase in SCr by greater than or equal to 0.3 mg/dl within 48 hours; or  -Increase or decrease in SCr to greater than or equal to 1.5 times baseline,   which is known or presumed to have occurred within the prior 7 days; or  -Urine volume < 0.5ml/kg/h for 6 hours  Options provided:  -- Acute kidney injury evidenced by, Please document evidence as well as   baseline creatinine, if known. -- Acute kidney injury ruled out after study  -- Other - I will add my own diagnosis  -- Disagree - Not applicable / Not valid  -- Disagree - Clinically unable to determine / Unknown  -- Refer to Clinical Documentation Reviewer    PROVIDER RESPONSE TEXT:    Acute kidney injury was ruled out after study.     Query created by: Lucina Bagley on 2021 1:16 PM      Electronically signed by:  Luis Barbosa 2021 1:22 PM